# Patient Record
Sex: FEMALE | Race: WHITE | HISPANIC OR LATINO
[De-identification: names, ages, dates, MRNs, and addresses within clinical notes are randomized per-mention and may not be internally consistent; named-entity substitution may affect disease eponyms.]

---

## 2017-02-21 ENCOUNTER — APPOINTMENT (OUTPATIENT)
Dept: INTERNAL MEDICINE | Facility: CLINIC | Age: 57
End: 2017-02-21

## 2017-02-21 ENCOUNTER — OUTPATIENT (OUTPATIENT)
Dept: OUTPATIENT SERVICES | Facility: HOSPITAL | Age: 57
LOS: 1 days | End: 2017-02-21
Payer: SELF-PAY

## 2017-02-21 VITALS
DIASTOLIC BLOOD PRESSURE: 60 MMHG | BODY MASS INDEX: 24.92 KG/M2 | WEIGHT: 132 LBS | SYSTOLIC BLOOD PRESSURE: 102 MMHG | HEIGHT: 61 IN

## 2017-02-21 DIAGNOSIS — R61 GENERALIZED HYPERHIDROSIS: ICD-10-CM

## 2017-02-21 DIAGNOSIS — I10 ESSENTIAL (PRIMARY) HYPERTENSION: ICD-10-CM

## 2017-02-21 DIAGNOSIS — R76.11 NONSPECIFIC REACTION TO TUBERCULIN SKIN TEST WITHOUT ACTIVE TUBERCULOSIS: ICD-10-CM

## 2017-02-21 PROCEDURE — G0463: CPT

## 2017-02-21 PROCEDURE — 90688 IIV4 VACCINE SPLT 0.5 ML IM: CPT

## 2017-02-21 PROCEDURE — G0008: CPT

## 2017-02-24 DIAGNOSIS — R76.12 NONSPECIFIC REACTION TO CELL MEDIATED IMMUNITY MEASUREMENT OF GAMMA INTERFERON ANTIGEN RESPONSE WITHOUT ACTIVE TUBERCULOSIS: ICD-10-CM

## 2017-02-24 DIAGNOSIS — Z23 ENCOUNTER FOR IMMUNIZATION: ICD-10-CM

## 2017-02-24 DIAGNOSIS — R61 GENERALIZED HYPERHIDROSIS: ICD-10-CM

## 2017-02-24 DIAGNOSIS — Z00.00 ENCOUNTER FOR GENERAL ADULT MEDICAL EXAMINATION WITHOUT ABNORMAL FINDINGS: ICD-10-CM

## 2017-02-24 DIAGNOSIS — K21.9 GASTRO-ESOPHAGEAL REFLUX DISEASE WITHOUT ESOPHAGITIS: ICD-10-CM

## 2017-03-28 ENCOUNTER — APPOINTMENT (OUTPATIENT)
Dept: GASTROENTEROLOGY | Facility: HOSPITAL | Age: 57
End: 2017-03-28

## 2017-03-28 ENCOUNTER — OUTPATIENT (OUTPATIENT)
Dept: OUTPATIENT SERVICES | Facility: HOSPITAL | Age: 57
LOS: 1 days | End: 2017-03-28
Payer: SELF-PAY

## 2017-03-28 VITALS
RESPIRATION RATE: 14 BRPM | HEART RATE: 64 BPM | HEIGHT: 61 IN | WEIGHT: 130 LBS | DIASTOLIC BLOOD PRESSURE: 72 MMHG | BODY MASS INDEX: 24.55 KG/M2 | SYSTOLIC BLOOD PRESSURE: 108 MMHG

## 2017-03-28 DIAGNOSIS — K31.9 DISEASE OF STOMACH AND DUODENUM, UNSPECIFIED: ICD-10-CM

## 2017-03-28 DIAGNOSIS — R13.10 DYSPHAGIA, UNSPECIFIED: ICD-10-CM

## 2017-03-28 DIAGNOSIS — Z00.00 ENCOUNTER FOR GENERAL ADULT MEDICAL EXAMINATION WITHOUT ABNORMAL FINDINGS: ICD-10-CM

## 2017-03-28 DIAGNOSIS — K21.9 GASTRO-ESOPHAGEAL REFLUX DISEASE WITHOUT ESOPHAGITIS: ICD-10-CM

## 2017-03-28 DIAGNOSIS — R76.12 NONSPECIFIC REACTION TO CELL MEDIATED IMMUNITY MEASUREMENT OF GAMMA INTERFERON ANTIGEN RESPONSE W/OUT ACTIVE TUBERCULOSIS: ICD-10-CM

## 2017-03-28 DIAGNOSIS — R76.12 NONSPECIFIC REACTION TO CELL MEDIATED IMMUNITY MEASUREMENT OF GAMMA INTERFERON ANTIGEN RESPONSE WITHOUT ACTIVE TUBERCULOSIS: ICD-10-CM

## 2017-03-28 PROCEDURE — G0463: CPT

## 2017-04-04 ENCOUNTER — RESULT REVIEW (OUTPATIENT)
Age: 57
End: 2017-04-04

## 2017-04-05 ENCOUNTER — OUTPATIENT (OUTPATIENT)
Dept: OUTPATIENT SERVICES | Facility: HOSPITAL | Age: 57
LOS: 1 days | End: 2017-04-05
Payer: SELF-PAY

## 2017-04-05 DIAGNOSIS — R13.10 DYSPHAGIA, UNSPECIFIED: ICD-10-CM

## 2017-04-05 PROCEDURE — 88312 SPECIAL STAINS GROUP 1: CPT | Mod: 26

## 2017-04-05 PROCEDURE — 88305 TISSUE EXAM BY PATHOLOGIST: CPT | Mod: 26

## 2017-04-05 PROCEDURE — 43239 EGD BIOPSY SINGLE/MULTIPLE: CPT

## 2017-04-05 PROCEDURE — 88305 TISSUE EXAM BY PATHOLOGIST: CPT

## 2017-04-05 PROCEDURE — 88312 SPECIAL STAINS GROUP 1: CPT

## 2017-04-07 LAB — SURGICAL PATHOLOGY STUDY: SIGNIFICANT CHANGE UP

## 2017-04-25 ENCOUNTER — OTHER (OUTPATIENT)
Age: 57
End: 2017-04-25

## 2017-05-15 ENCOUNTER — LABORATORY RESULT (OUTPATIENT)
Age: 57
End: 2017-05-15

## 2017-05-15 ENCOUNTER — APPOINTMENT (OUTPATIENT)
Dept: INTERNAL MEDICINE | Facility: CLINIC | Age: 57
End: 2017-05-15

## 2017-05-15 ENCOUNTER — OUTPATIENT (OUTPATIENT)
Dept: OUTPATIENT SERVICES | Facility: HOSPITAL | Age: 57
LOS: 1 days | End: 2017-05-15
Payer: SELF-PAY

## 2017-05-15 ENCOUNTER — RESULT CHARGE (OUTPATIENT)
Age: 57
End: 2017-05-15

## 2017-05-15 VITALS
HEART RATE: 62 BPM | BODY MASS INDEX: 25.3 KG/M2 | WEIGHT: 134 LBS | HEIGHT: 61 IN | DIASTOLIC BLOOD PRESSURE: 60 MMHG | SYSTOLIC BLOOD PRESSURE: 90 MMHG

## 2017-05-15 DIAGNOSIS — N93.9 ABNORMAL UTERINE AND VAGINAL BLEEDING, UNSPECIFIED: ICD-10-CM

## 2017-05-15 DIAGNOSIS — I10 ESSENTIAL (PRIMARY) HYPERTENSION: ICD-10-CM

## 2017-05-15 PROCEDURE — 80048 BASIC METABOLIC PNL TOTAL CA: CPT

## 2017-05-15 PROCEDURE — 80061 LIPID PANEL: CPT

## 2017-05-15 PROCEDURE — 84443 ASSAY THYROID STIM HORMONE: CPT

## 2017-05-15 PROCEDURE — 90715 TDAP VACCINE 7 YRS/> IM: CPT

## 2017-05-15 PROCEDURE — G0463: CPT

## 2017-05-15 PROCEDURE — 90471 IMMUNIZATION ADMIN: CPT

## 2017-05-15 PROCEDURE — 85027 COMPLETE CBC AUTOMATED: CPT

## 2017-05-15 PROCEDURE — 83036 HEMOGLOBIN GLYCOSYLATED A1C: CPT

## 2017-05-16 LAB
ANION GAP SERPL CALC-SCNC: 15 MMOL/L — SIGNIFICANT CHANGE UP (ref 5–17)
BUN SERPL-MCNC: 16 MG/DL — SIGNIFICANT CHANGE UP (ref 7–23)
CALCIUM SERPL-MCNC: 9.8 MG/DL — SIGNIFICANT CHANGE UP (ref 8.4–10.5)
CHLORIDE SERPL-SCNC: 102 MMOL/L — SIGNIFICANT CHANGE UP (ref 96–108)
CHOLEST SERPL-MCNC: 203 MG/DL — HIGH (ref 10–199)
CO2 SERPL-SCNC: 25 MMOL/L — SIGNIFICANT CHANGE UP (ref 22–31)
CREAT SERPL-MCNC: 0.87 MG/DL — SIGNIFICANT CHANGE UP (ref 0.5–1.3)
GLUCOSE SERPL-MCNC: 94 MG/DL — SIGNIFICANT CHANGE UP (ref 70–99)
HBA1C BLD-MCNC: 5.6 % — SIGNIFICANT CHANGE UP (ref 4–5.6)
HBA1C MFR BLD HPLC: 5.5
HCT VFR BLD CALC: 45.1 % — HIGH (ref 34.5–45)
HDLC SERPL-MCNC: 57 MG/DL — SIGNIFICANT CHANGE UP (ref 40–125)
HGB BLD-MCNC: 15.1 G/DL — SIGNIFICANT CHANGE UP (ref 11.5–15.5)
LIPID PNL WITH DIRECT LDL SERPL: 131 MG/DL — HIGH
MCHC RBC-ENTMCNC: 31.7 PG — SIGNIFICANT CHANGE UP (ref 27–34)
MCHC RBC-ENTMCNC: 33.5 GM/DL — SIGNIFICANT CHANGE UP (ref 32–36)
MCV RBC AUTO: 94.7 FL — SIGNIFICANT CHANGE UP (ref 80–100)
PLATELET # BLD AUTO: 255 K/UL — SIGNIFICANT CHANGE UP (ref 150–400)
POTASSIUM SERPL-MCNC: 4.3 MMOL/L — SIGNIFICANT CHANGE UP (ref 3.5–5.3)
POTASSIUM SERPL-SCNC: 4.3 MMOL/L — SIGNIFICANT CHANGE UP (ref 3.5–5.3)
RBC # BLD: 4.76 M/UL — SIGNIFICANT CHANGE UP (ref 3.8–5.2)
RBC # FLD: 13.7 % — SIGNIFICANT CHANGE UP (ref 10.3–14.5)
SODIUM SERPL-SCNC: 142 MMOL/L — SIGNIFICANT CHANGE UP (ref 135–145)
TOTAL CHOLESTEROL/HDL RATIO MEASUREMENT: 3.6 RATIO — SIGNIFICANT CHANGE UP (ref 3.3–7.1)
TRIGL SERPL-MCNC: 77 MG/DL — SIGNIFICANT CHANGE UP (ref 10–149)
TSH SERPL-MCNC: 2.24 UIU/ML — SIGNIFICANT CHANGE UP (ref 0.27–4.2)
WBC # BLD: 5.24 K/UL — SIGNIFICANT CHANGE UP (ref 3.8–10.5)
WBC # FLD AUTO: 5.24 K/UL — SIGNIFICANT CHANGE UP (ref 3.8–10.5)

## 2017-05-19 RX ORDER — SUCRALFATE 1 G/1
1 TABLET ORAL
Qty: 31 | Refills: 1 | Status: DISCONTINUED | COMMUNITY
Start: 2017-05-15 | End: 2017-05-19

## 2017-05-22 ENCOUNTER — LABORATORY RESULT (OUTPATIENT)
Age: 57
End: 2017-05-22

## 2017-05-22 ENCOUNTER — RESULT REVIEW (OUTPATIENT)
Age: 57
End: 2017-05-22

## 2017-05-23 ENCOUNTER — APPOINTMENT (OUTPATIENT)
Dept: OBGYN | Facility: CLINIC | Age: 57
End: 2017-05-23

## 2017-05-23 ENCOUNTER — OUTPATIENT (OUTPATIENT)
Dept: OUTPATIENT SERVICES | Facility: HOSPITAL | Age: 57
LOS: 1 days | End: 2017-05-23
Payer: SELF-PAY

## 2017-05-23 ENCOUNTER — RESULT REVIEW (OUTPATIENT)
Age: 57
End: 2017-05-23

## 2017-05-23 VITALS — HEIGHT: 61 IN | BODY MASS INDEX: 25.77 KG/M2 | WEIGHT: 136.5 LBS

## 2017-05-23 DIAGNOSIS — Z01.419 ENCOUNTER FOR GYNECOLOGICAL EXAMINATION (GENERAL) (ROUTINE) W/OUT ABNORMAL FINDINGS: ICD-10-CM

## 2017-05-23 DIAGNOSIS — N76.0 ACUTE VAGINITIS: ICD-10-CM

## 2017-05-23 PROCEDURE — G0463: CPT

## 2017-05-23 PROCEDURE — 87624 HPV HI-RISK TYP POOLED RSLT: CPT

## 2017-05-23 PROCEDURE — 88305 TISSUE EXAM BY PATHOLOGIST: CPT

## 2017-05-23 PROCEDURE — 88305 TISSUE EXAM BY PATHOLOGIST: CPT | Mod: 26

## 2017-05-24 LAB
C TRACH RRNA SPEC QL NAA+PROBE: SIGNIFICANT CHANGE UP
HPV HIGH+LOW RISK DNA PNL CVX: SIGNIFICANT CHANGE UP
N GONORRHOEA RRNA SPEC QL NAA+PROBE: SIGNIFICANT CHANGE UP
SPECIMEN SOURCE: SIGNIFICANT CHANGE UP

## 2017-05-26 LAB — CYTOLOGY SPEC DOC CYTO: SIGNIFICANT CHANGE UP

## 2017-05-30 DIAGNOSIS — N93.9 ABNORMAL UTERINE AND VAGINAL BLEEDING, UNSPECIFIED: ICD-10-CM

## 2017-05-30 DIAGNOSIS — N95.0 POSTMENOPAUSAL BLEEDING: ICD-10-CM

## 2017-05-30 DIAGNOSIS — Z01.419 ENCOUNTER FOR GYNECOLOGICAL EXAMINATION (GENERAL) (ROUTINE) WITHOUT ABNORMAL FINDINGS: ICD-10-CM

## 2017-05-30 DIAGNOSIS — K21.9 GASTRO-ESOPHAGEAL REFLUX DISEASE WITHOUT ESOPHAGITIS: ICD-10-CM

## 2017-06-18 ENCOUNTER — FORM ENCOUNTER (OUTPATIENT)
Age: 57
End: 2017-06-18

## 2017-06-19 ENCOUNTER — MESSAGE (OUTPATIENT)
Age: 57
End: 2017-06-19

## 2017-06-19 ENCOUNTER — OUTPATIENT (OUTPATIENT)
Dept: OUTPATIENT SERVICES | Facility: HOSPITAL | Age: 57
LOS: 1 days | End: 2017-06-19
Payer: SELF-PAY

## 2017-06-19 ENCOUNTER — APPOINTMENT (OUTPATIENT)
Dept: ULTRASOUND IMAGING | Facility: CLINIC | Age: 57
End: 2017-06-19

## 2017-06-19 DIAGNOSIS — N95.0 POSTMENOPAUSAL BLEEDING: ICD-10-CM

## 2017-06-19 DIAGNOSIS — Z01.419 ENCOUNTER FOR GYNECOLOGICAL EXAMINATION (GENERAL) (ROUTINE) WITHOUT ABNORMAL FINDINGS: ICD-10-CM

## 2017-06-19 PROCEDURE — 76856 US EXAM PELVIC COMPLETE: CPT

## 2017-06-19 PROCEDURE — 76830 TRANSVAGINAL US NON-OB: CPT

## 2017-07-09 ENCOUNTER — FORM ENCOUNTER (OUTPATIENT)
Age: 57
End: 2017-07-09

## 2017-07-10 ENCOUNTER — OUTPATIENT (OUTPATIENT)
Dept: OUTPATIENT SERVICES | Facility: HOSPITAL | Age: 57
LOS: 1 days | End: 2017-07-10
Payer: SELF-PAY

## 2017-07-10 ENCOUNTER — APPOINTMENT (OUTPATIENT)
Dept: MRI IMAGING | Facility: CLINIC | Age: 57
End: 2017-07-10

## 2017-07-10 DIAGNOSIS — N95.0 POSTMENOPAUSAL BLEEDING: ICD-10-CM

## 2017-07-10 PROCEDURE — A9585: CPT

## 2017-07-10 PROCEDURE — 72197 MRI PELVIS W/O & W/DYE: CPT

## 2017-07-11 ENCOUNTER — APPOINTMENT (OUTPATIENT)
Dept: GASTROENTEROLOGY | Facility: HOSPITAL | Age: 57
End: 2017-07-11

## 2017-09-11 ENCOUNTER — APPOINTMENT (OUTPATIENT)
Dept: OBGYN | Facility: CLINIC | Age: 57
End: 2017-09-11
Payer: COMMERCIAL

## 2017-09-11 ENCOUNTER — OUTPATIENT (OUTPATIENT)
Dept: OUTPATIENT SERVICES | Facility: HOSPITAL | Age: 57
LOS: 1 days | End: 2017-09-11
Payer: SELF-PAY

## 2017-09-11 VITALS — HEIGHT: 61 IN | BODY MASS INDEX: 27.21 KG/M2 | WEIGHT: 144.13 LBS

## 2017-09-11 DIAGNOSIS — N76.0 ACUTE VAGINITIS: ICD-10-CM

## 2017-09-11 DIAGNOSIS — N95.0 POSTMENOPAUSAL BLEEDING: ICD-10-CM

## 2017-09-11 DIAGNOSIS — N84.0 POLYP OF CORPUS UTERI: ICD-10-CM

## 2017-09-11 PROCEDURE — G0463: CPT

## 2017-09-11 PROCEDURE — 99213 OFFICE O/P EST LOW 20 MIN: CPT | Mod: NC

## 2017-09-19 DIAGNOSIS — N84.0 POLYP OF CORPUS UTERI: ICD-10-CM

## 2017-09-19 DIAGNOSIS — N95.0 POSTMENOPAUSAL BLEEDING: ICD-10-CM

## 2017-11-27 ENCOUNTER — OUTPATIENT (OUTPATIENT)
Dept: OUTPATIENT SERVICES | Facility: HOSPITAL | Age: 57
LOS: 1 days | End: 2017-11-27
Payer: SELF-PAY

## 2017-11-27 VITALS
RESPIRATION RATE: 14 BRPM | TEMPERATURE: 98 F | WEIGHT: 141.1 LBS | DIASTOLIC BLOOD PRESSURE: 78 MMHG | SYSTOLIC BLOOD PRESSURE: 108 MMHG | OXYGEN SATURATION: 97 % | HEART RATE: 53 BPM | HEIGHT: 61 IN

## 2017-11-27 DIAGNOSIS — Z98.890 OTHER SPECIFIED POSTPROCEDURAL STATES: Chronic | ICD-10-CM

## 2017-11-27 DIAGNOSIS — Z01.818 ENCOUNTER FOR OTHER PREPROCEDURAL EXAMINATION: ICD-10-CM

## 2017-11-27 DIAGNOSIS — N95.0 POSTMENOPAUSAL BLEEDING: ICD-10-CM

## 2017-11-27 DIAGNOSIS — N84.0 POLYP OF CORPUS UTERI: ICD-10-CM

## 2017-11-27 LAB
HCT VFR BLD CALC: 40.8 % — SIGNIFICANT CHANGE UP (ref 34.5–45)
HGB BLD-MCNC: 13.5 G/DL — SIGNIFICANT CHANGE UP (ref 11.5–15.5)
MCHC RBC-ENTMCNC: 30.8 PG — SIGNIFICANT CHANGE UP (ref 27–34)
MCHC RBC-ENTMCNC: 33.1 GM/DL — SIGNIFICANT CHANGE UP (ref 32–36)
MCV RBC AUTO: 93.2 FL — SIGNIFICANT CHANGE UP (ref 80–100)
PLATELET # BLD AUTO: 308 K/UL — SIGNIFICANT CHANGE UP (ref 150–400)
RBC # BLD: 4.38 M/UL — SIGNIFICANT CHANGE UP (ref 3.8–5.2)
RBC # FLD: 13.4 % — SIGNIFICANT CHANGE UP (ref 10.3–14.5)
WBC # BLD: 5.65 K/UL — SIGNIFICANT CHANGE UP (ref 3.8–10.5)
WBC # FLD AUTO: 5.65 K/UL — SIGNIFICANT CHANGE UP (ref 3.8–10.5)

## 2017-11-27 PROCEDURE — G0463: CPT

## 2017-11-27 PROCEDURE — 85027 COMPLETE CBC AUTOMATED: CPT

## 2017-11-27 RX ORDER — SODIUM CHLORIDE 9 MG/ML
3 INJECTION INTRAMUSCULAR; INTRAVENOUS; SUBCUTANEOUS EVERY 8 HOURS
Qty: 0 | Refills: 0 | Status: DISCONTINUED | OUTPATIENT
Start: 2017-12-01 | End: 2017-12-16

## 2017-11-27 RX ORDER — ACETAMINOPHEN 500 MG
975 TABLET ORAL ONCE
Qty: 0 | Refills: 0 | Status: COMPLETED | OUTPATIENT
Start: 2017-12-01 | End: 2017-12-01

## 2017-11-27 RX ORDER — LIDOCAINE HCL 20 MG/ML
0.2 VIAL (ML) INJECTION ONCE
Qty: 0 | Refills: 0 | Status: DISCONTINUED | OUTPATIENT
Start: 2017-12-01 | End: 2017-12-16

## 2017-11-27 NOTE — H&P PST ADULT - NSANTHOSAYNRD_GEN_A_CORE
No. SAÚL screening performed.  STOP BANG Legend: 0-2 = LOW Risk; 3-4 = INTERMEDIATE Risk; 5-8 = HIGH Risk

## 2017-11-27 NOTE — H&P PST ADULT - PSH
H/O right inguinal hernia repair    History of bunionectomy of both great toes    Personal history of spine surgery  lumbar with hardware  S/P LASIK surgery  left eye

## 2017-11-27 NOTE — H&P PST ADULT - HISTORY OF PRESENT ILLNESS
58 yo female. . LMP at age 48. h/o + QuantGold with neg CXR- h/o treatment x 8 months when pt was in her 20's, last CXR in 2016- clear per pt, denies SOB, cough, lethargy, flu like symptoms, GERD, hypothyroidism. c/o PMB, pelvic sono revealed endometrial polyp. presents to PST scheduled for D&C. 56 yo female. . LMP at age 48.  c/o PMB, pelvic sono revealed endometrial polyp. presents to PST scheduled for D&C.  h/o flu like symptoms, GERD, hypothyroidism, QuantiFERON-TB test reaction without active TB (pt was treated with INH x 8months in her 20's, -CXR- no change from biapical scarring, otherwise clear,  currently denies SOB, cough, lethargy, s/w anesthesiologist, Dr. Bright, no intervention needed.

## 2017-12-01 ENCOUNTER — RESULT REVIEW (OUTPATIENT)
Age: 57
End: 2017-12-01

## 2017-12-01 ENCOUNTER — APPOINTMENT (OUTPATIENT)
Dept: OBGYN | Facility: CLINIC | Age: 57
End: 2017-12-01

## 2017-12-01 ENCOUNTER — OUTPATIENT (OUTPATIENT)
Dept: OUTPATIENT SERVICES | Facility: HOSPITAL | Age: 57
LOS: 1 days | End: 2017-12-01
Payer: SELF-PAY

## 2017-12-01 ENCOUNTER — TRANSCRIPTION ENCOUNTER (OUTPATIENT)
Age: 57
End: 2017-12-01

## 2017-12-01 VITALS
TEMPERATURE: 98 F | OXYGEN SATURATION: 96 % | DIASTOLIC BLOOD PRESSURE: 68 MMHG | SYSTOLIC BLOOD PRESSURE: 114 MMHG | HEART RATE: 53 BPM | WEIGHT: 141.1 LBS | HEIGHT: 61 IN | RESPIRATION RATE: 14 BRPM

## 2017-12-01 VITALS
OXYGEN SATURATION: 100 % | HEART RATE: 51 BPM | DIASTOLIC BLOOD PRESSURE: 53 MMHG | SYSTOLIC BLOOD PRESSURE: 112 MMHG | RESPIRATION RATE: 14 BRPM

## 2017-12-01 DIAGNOSIS — Z98.890 OTHER SPECIFIED POSTPROCEDURAL STATES: Chronic | ICD-10-CM

## 2017-12-01 DIAGNOSIS — N95.0 POSTMENOPAUSAL BLEEDING: ICD-10-CM

## 2017-12-01 DIAGNOSIS — Z01.818 ENCOUNTER FOR OTHER PREPROCEDURAL EXAMINATION: ICD-10-CM

## 2017-12-01 DIAGNOSIS — N84.0 POLYP OF CORPUS UTERI: ICD-10-CM

## 2017-12-01 PROCEDURE — 58558 HYSTEROSCOPY BIOPSY: CPT

## 2017-12-01 PROCEDURE — 88305 TISSUE EXAM BY PATHOLOGIST: CPT

## 2017-12-01 PROCEDURE — 88305 TISSUE EXAM BY PATHOLOGIST: CPT | Mod: 26

## 2017-12-01 RX ORDER — CELECOXIB 200 MG/1
200 CAPSULE ORAL ONCE
Qty: 0 | Refills: 0 | Status: DISCONTINUED | OUTPATIENT
Start: 2017-12-01 | End: 2017-12-16

## 2017-12-01 RX ORDER — RANITIDINE HYDROCHLORIDE 150 MG/1
1 TABLET, FILM COATED ORAL
Qty: 0 | Refills: 0 | COMMUNITY

## 2017-12-01 RX ORDER — SODIUM CHLORIDE 9 MG/ML
1000 INJECTION, SOLUTION INTRAVENOUS
Qty: 0 | Refills: 0 | Status: DISCONTINUED | OUTPATIENT
Start: 2017-12-01 | End: 2017-12-16

## 2017-12-01 RX ORDER — HYDROMORPHONE HYDROCHLORIDE 2 MG/ML
0.25 INJECTION INTRAMUSCULAR; INTRAVENOUS; SUBCUTANEOUS
Qty: 0 | Refills: 0 | Status: DISCONTINUED | OUTPATIENT
Start: 2017-12-01 | End: 2017-12-01

## 2017-12-01 RX ORDER — ONDANSETRON 8 MG/1
4 TABLET, FILM COATED ORAL ONCE
Qty: 0 | Refills: 0 | Status: DISCONTINUED | OUTPATIENT
Start: 2017-12-01 | End: 2017-12-16

## 2017-12-01 RX ORDER — PANTOPRAZOLE SODIUM 20 MG/1
1 TABLET, DELAYED RELEASE ORAL
Qty: 0 | Refills: 0 | COMMUNITY

## 2017-12-01 RX ORDER — LEVOTHYROXINE SODIUM 125 MCG
1 TABLET ORAL
Qty: 0 | Refills: 0 | COMMUNITY

## 2017-12-01 RX ORDER — OXYCODONE HYDROCHLORIDE 5 MG/1
5 TABLET ORAL ONCE
Qty: 0 | Refills: 0 | Status: DISCONTINUED | OUTPATIENT
Start: 2017-12-01 | End: 2017-12-01

## 2017-12-01 RX ORDER — CELECOXIB 200 MG/1
200 CAPSULE ORAL ONCE
Qty: 0 | Refills: 0 | Status: COMPLETED | OUTPATIENT
Start: 2017-12-01 | End: 2017-12-01

## 2017-12-01 RX ADMIN — CELECOXIB 200 MILLIGRAM(S): 200 CAPSULE ORAL at 11:59

## 2017-12-01 RX ADMIN — Medication 975 MILLIGRAM(S): at 11:59

## 2017-12-01 NOTE — BRIEF OPERATIVE NOTE - PROCEDURE
Hysteroscopy with biopsy or polypectomy  12/01/2017    Active  RSHIBATA  Dilation and curettage  12/01/2017    Active  RSHIBATA <<-----Click on this checkbox to enter Procedure

## 2017-12-01 NOTE — ASU DISCHARGE PLAN (ADULT/PEDIATRIC). - NOTIFY
Persistent Nausea and Vomiting/Unable to Urinate/Bleeding that does not stop/Increased Irritability or Sluggishness/Numbness, color, or temperature change to extremity/Pain not relieved by Medications/Fever greater than 101/GYN Fever>100.4/Numbness, tingling/Inability to Tolerate Liquids or Foods/Excessive Diarrhea/Swelling that continues

## 2017-12-01 NOTE — ASU PATIENT PROFILE, ADULT - PMH
Arthritis    GERD (gastroesophageal reflux disease)    H. pylori infection  treated  History of positive PPD  treated when pt was in her 20's  Hypothyroidism, unspecified type    Migraine

## 2017-12-01 NOTE — BRIEF OPERATIVE NOTE - OPERATION/FINDINGS
Exam under general anesthesia revealed a 5 week sized uterus in the midposition. No adnexal masses palpated. Hysteroscopic examination revealed a small polyp in the right cornua of the fundus. Atropic normal appearing endometrial cavity. Bilateral os visualized, wnl.

## 2017-12-01 NOTE — ASU DISCHARGE PLAN (ADULT/PEDIATRIC). - ITEMS TO FOLLOWUP WITH YOUR PHYSICIAN'S
Follow-up in the office in 2 weeks ([phone]). Call to make/confirm you appointment. Take ibuprofen or acetaminophen  as needed for pain.

## 2017-12-05 LAB — SURGICAL PATHOLOGY STUDY: SIGNIFICANT CHANGE UP

## 2017-12-18 ENCOUNTER — OUTPATIENT (OUTPATIENT)
Dept: OUTPATIENT SERVICES | Facility: HOSPITAL | Age: 57
LOS: 1 days | End: 2017-12-18
Payer: SELF-PAY

## 2017-12-18 ENCOUNTER — APPOINTMENT (OUTPATIENT)
Dept: OBGYN | Facility: CLINIC | Age: 57
End: 2017-12-18
Payer: SELF-PAY

## 2017-12-18 VITALS — SYSTOLIC BLOOD PRESSURE: 120 MMHG | BODY MASS INDEX: 27.4 KG/M2 | DIASTOLIC BLOOD PRESSURE: 70 MMHG | WEIGHT: 145 LBS

## 2017-12-18 DIAGNOSIS — N84.0 POLYP OF CORPUS UTERI: ICD-10-CM

## 2017-12-18 DIAGNOSIS — Z98.890 OTHER SPECIFIED POSTPROCEDURAL STATES: Chronic | ICD-10-CM

## 2017-12-18 DIAGNOSIS — N95.2 POSTMENOPAUSAL ATROPHIC VAGINITIS: ICD-10-CM

## 2017-12-18 DIAGNOSIS — N76.0 ACUTE VAGINITIS: ICD-10-CM

## 2017-12-18 PROCEDURE — 99213 OFFICE O/P EST LOW 20 MIN: CPT | Mod: NC

## 2017-12-18 PROCEDURE — G0463: CPT

## 2017-12-27 DIAGNOSIS — N95.2 POSTMENOPAUSAL ATROPHIC VAGINITIS: ICD-10-CM

## 2018-01-26 ENCOUNTER — APPOINTMENT (OUTPATIENT)
Dept: INTERNAL MEDICINE | Facility: CLINIC | Age: 58
End: 2018-01-26

## 2018-01-26 ENCOUNTER — APPOINTMENT (OUTPATIENT)
Dept: OBGYN | Facility: CLINIC | Age: 58
End: 2018-01-26

## 2018-01-26 ENCOUNTER — OUTPATIENT (OUTPATIENT)
Dept: OUTPATIENT SERVICES | Facility: HOSPITAL | Age: 58
LOS: 1 days | End: 2018-01-26
Payer: SELF-PAY

## 2018-01-26 VITALS
OXYGEN SATURATION: 98 % | SYSTOLIC BLOOD PRESSURE: 118 MMHG | HEART RATE: 60 BPM | HEIGHT: 61 IN | DIASTOLIC BLOOD PRESSURE: 60 MMHG | BODY MASS INDEX: 26.24 KG/M2 | WEIGHT: 139 LBS

## 2018-01-26 DIAGNOSIS — Z98.890 OTHER SPECIFIED POSTPROCEDURAL STATES: Chronic | ICD-10-CM

## 2018-01-26 DIAGNOSIS — R69 ILLNESS, UNSPECIFIED: ICD-10-CM

## 2018-01-26 DIAGNOSIS — I10 ESSENTIAL (PRIMARY) HYPERTENSION: ICD-10-CM

## 2018-01-26 DIAGNOSIS — K29.70 GASTRITIS, UNSPECIFIED, W/OUT BLEEDING: ICD-10-CM

## 2018-01-26 PROCEDURE — G0463: CPT

## 2018-01-29 ENCOUNTER — MED ADMIN CHARGE (OUTPATIENT)
Age: 58
End: 2018-01-29

## 2018-02-08 DIAGNOSIS — K21.9 GASTRO-ESOPHAGEAL REFLUX DISEASE WITHOUT ESOPHAGITIS: ICD-10-CM

## 2018-02-08 DIAGNOSIS — R69 ILLNESS, UNSPECIFIED: ICD-10-CM

## 2018-02-08 DIAGNOSIS — K29.70 GASTRITIS, UNSPECIFIED, WITHOUT BLEEDING: ICD-10-CM

## 2018-04-10 ENCOUNTER — RX RENEWAL (OUTPATIENT)
Age: 58
End: 2018-04-10

## 2018-04-24 ENCOUNTER — APPOINTMENT (OUTPATIENT)
Dept: GASTROENTEROLOGY | Facility: HOSPITAL | Age: 58
End: 2018-04-24

## 2018-04-24 ENCOUNTER — OUTPATIENT (OUTPATIENT)
Dept: OUTPATIENT SERVICES | Facility: HOSPITAL | Age: 58
LOS: 1 days | End: 2018-04-24
Payer: SELF-PAY

## 2018-04-24 VITALS
HEART RATE: 57 BPM | HEIGHT: 61 IN | BODY MASS INDEX: 27.19 KG/M2 | RESPIRATION RATE: 16 BRPM | SYSTOLIC BLOOD PRESSURE: 116 MMHG | DIASTOLIC BLOOD PRESSURE: 68 MMHG | WEIGHT: 144 LBS

## 2018-04-24 DIAGNOSIS — R10.9 UNSPECIFIED ABDOMINAL PAIN: ICD-10-CM

## 2018-04-24 DIAGNOSIS — Z98.890 OTHER SPECIFIED POSTPROCEDURAL STATES: Chronic | ICD-10-CM

## 2018-04-24 DIAGNOSIS — K59.00 CONSTIPATION, UNSPECIFIED: ICD-10-CM

## 2018-04-24 DIAGNOSIS — K21.9 GASTRO-ESOPHAGEAL REFLUX DISEASE WITHOUT ESOPHAGITIS: ICD-10-CM

## 2018-04-24 DIAGNOSIS — K29.70 GASTRITIS, UNSPECIFIED, WITHOUT BLEEDING: ICD-10-CM

## 2018-04-24 DIAGNOSIS — K31.0 ACUTE DILATATION OF STOMACH: ICD-10-CM

## 2018-04-24 LAB
ALBUMIN SERPL ELPH-MCNC: 4.3 G/DL — SIGNIFICANT CHANGE UP (ref 3.3–5)
ALP SERPL-CCNC: 75 U/L — SIGNIFICANT CHANGE UP (ref 40–120)
ALT FLD-CCNC: 18 U/L — SIGNIFICANT CHANGE UP (ref 10–45)
ANION GAP SERPL CALC-SCNC: 13 MMOL/L — SIGNIFICANT CHANGE UP (ref 5–17)
AST SERPL-CCNC: 20 U/L — SIGNIFICANT CHANGE UP (ref 10–40)
BILIRUB SERPL-MCNC: 0.3 MG/DL — SIGNIFICANT CHANGE UP (ref 0.2–1.2)
BUN SERPL-MCNC: 15 MG/DL — SIGNIFICANT CHANGE UP (ref 7–23)
CALCIUM SERPL-MCNC: 10 MG/DL — SIGNIFICANT CHANGE UP (ref 8.4–10.5)
CHLORIDE SERPL-SCNC: 102 MMOL/L — SIGNIFICANT CHANGE UP (ref 96–108)
CO2 SERPL-SCNC: 26 MMOL/L — SIGNIFICANT CHANGE UP (ref 22–31)
CREAT SERPL-MCNC: 0.68 MG/DL — SIGNIFICANT CHANGE UP (ref 0.5–1.3)
GLUCOSE SERPL-MCNC: 91 MG/DL — SIGNIFICANT CHANGE UP (ref 70–99)
POTASSIUM SERPL-MCNC: 4.1 MMOL/L — SIGNIFICANT CHANGE UP (ref 3.5–5.3)
POTASSIUM SERPL-SCNC: 4.1 MMOL/L — SIGNIFICANT CHANGE UP (ref 3.5–5.3)
PROT SERPL-MCNC: 7.8 G/DL — SIGNIFICANT CHANGE UP (ref 6–8.3)
SODIUM SERPL-SCNC: 141 MMOL/L — SIGNIFICANT CHANGE UP (ref 135–145)
T4 FREE+ TSH PNL SERPL: 3.4 UIU/ML — SIGNIFICANT CHANGE UP (ref 0.27–4.2)

## 2018-04-24 PROCEDURE — 85652 RBC SED RATE AUTOMATED: CPT

## 2018-04-24 PROCEDURE — G0463: CPT

## 2018-04-24 PROCEDURE — 80053 COMPREHEN METABOLIC PANEL: CPT

## 2018-04-24 PROCEDURE — 84443 ASSAY THYROID STIM HORMONE: CPT

## 2018-04-24 PROCEDURE — 86364 TISS TRNSGLTMNASE EA IG CLAS: CPT

## 2018-04-24 PROCEDURE — 85027 COMPLETE CBC AUTOMATED: CPT

## 2018-04-25 LAB
ERYTHROCYTE [SEDIMENTATION RATE] IN BLOOD: 16 MM/HR — SIGNIFICANT CHANGE UP (ref 0–20)
HCT VFR BLD CALC: 41.4 % — SIGNIFICANT CHANGE UP (ref 34.5–45)
HGB BLD-MCNC: 14.5 G/DL — SIGNIFICANT CHANGE UP (ref 11.5–15.5)
MCHC RBC-ENTMCNC: 33 PG — SIGNIFICANT CHANGE UP (ref 27–34)
MCHC RBC-ENTMCNC: 35 GM/DL — SIGNIFICANT CHANGE UP (ref 32–36)
MCV RBC AUTO: 94.3 FL — SIGNIFICANT CHANGE UP (ref 80–100)
PLATELET # BLD AUTO: 292 K/UL — SIGNIFICANT CHANGE UP (ref 150–400)
RBC # BLD: 4.39 M/UL — SIGNIFICANT CHANGE UP (ref 3.8–5.2)
RBC # FLD: 13.8 % — SIGNIFICANT CHANGE UP (ref 10.3–14.5)
TTG IGA SER-ACNC: 7.6 UNITS — SIGNIFICANT CHANGE UP
TTG IGA SER-ACNC: NEGATIVE — SIGNIFICANT CHANGE UP
TTG IGG SER IA-ACNC: NEGATIVE — SIGNIFICANT CHANGE UP
TTG IGG SER-ACNC: <5 UNITS — SIGNIFICANT CHANGE UP
WBC # BLD: 7.01 K/UL — SIGNIFICANT CHANGE UP (ref 3.8–10.5)
WBC # FLD AUTO: 7.01 K/UL — SIGNIFICANT CHANGE UP (ref 3.8–10.5)

## 2018-05-09 ENCOUNTER — OUTPATIENT (OUTPATIENT)
Dept: OUTPATIENT SERVICES | Facility: HOSPITAL | Age: 58
LOS: 1 days | End: 2018-05-09
Payer: SELF-PAY

## 2018-05-09 ENCOUNTER — OUTPATIENT (OUTPATIENT)
Dept: OUTPATIENT SERVICES | Facility: HOSPITAL | Age: 58
LOS: 1 days | End: 2018-05-09

## 2018-05-09 ENCOUNTER — APPOINTMENT (OUTPATIENT)
Dept: CT IMAGING | Facility: CLINIC | Age: 58
End: 2018-05-09
Payer: COMMERCIAL

## 2018-05-09 DIAGNOSIS — Z98.890 OTHER SPECIFIED POSTPROCEDURAL STATES: Chronic | ICD-10-CM

## 2018-05-09 DIAGNOSIS — K59.00 CONSTIPATION, UNSPECIFIED: ICD-10-CM

## 2018-05-09 DIAGNOSIS — R10.9 UNSPECIFIED ABDOMINAL PAIN: ICD-10-CM

## 2018-05-09 PROCEDURE — 74177 CT ABD & PELVIS W/CONTRAST: CPT | Mod: 26

## 2018-05-09 PROCEDURE — 74177 CT ABD & PELVIS W/CONTRAST: CPT

## 2018-05-09 PROCEDURE — 87338 HPYLORI STOOL AG IA: CPT

## 2018-05-11 LAB — H PYLORI AG STL QL: NEGATIVE — SIGNIFICANT CHANGE UP

## 2018-05-21 ENCOUNTER — APPOINTMENT (OUTPATIENT)
Dept: INTERNAL MEDICINE | Facility: CLINIC | Age: 58
End: 2018-05-21

## 2018-06-08 ENCOUNTER — RESULT REVIEW (OUTPATIENT)
Age: 58
End: 2018-06-08

## 2018-07-05 ENCOUNTER — RX RENEWAL (OUTPATIENT)
Age: 58
End: 2018-07-05

## 2019-01-02 PROBLEM — R76.11 NONSPECIFIC REACTION TO TUBERCULIN SKIN TEST WITHOUT ACTIVE TUBERCULOSIS: Chronic | Status: ACTIVE | Noted: 2017-11-27

## 2019-01-02 PROBLEM — G43.909 MIGRAINE, UNSPECIFIED, NOT INTRACTABLE, WITHOUT STATUS MIGRAINOSUS: Chronic | Status: ACTIVE | Noted: 2017-11-27

## 2019-01-02 PROBLEM — K21.9 GASTRO-ESOPHAGEAL REFLUX DISEASE WITHOUT ESOPHAGITIS: Chronic | Status: ACTIVE | Noted: 2017-11-27

## 2019-01-02 PROBLEM — A04.8 OTHER SPECIFIED BACTERIAL INTESTINAL INFECTIONS: Chronic | Status: ACTIVE | Noted: 2017-11-27

## 2019-01-09 ENCOUNTER — OUTPATIENT (OUTPATIENT)
Dept: OUTPATIENT SERVICES | Facility: HOSPITAL | Age: 59
LOS: 1 days | End: 2019-01-09
Payer: SELF-PAY

## 2019-01-09 ENCOUNTER — LABORATORY RESULT (OUTPATIENT)
Age: 59
End: 2019-01-09

## 2019-01-09 ENCOUNTER — APPOINTMENT (OUTPATIENT)
Dept: INTERNAL MEDICINE | Facility: CLINIC | Age: 59
End: 2019-01-09

## 2019-01-09 VITALS
DIASTOLIC BLOOD PRESSURE: 80 MMHG | SYSTOLIC BLOOD PRESSURE: 120 MMHG | HEIGHT: 61 IN | BODY MASS INDEX: 26.43 KG/M2 | WEIGHT: 140 LBS

## 2019-01-09 DIAGNOSIS — Z98.890 OTHER SPECIFIED POSTPROCEDURAL STATES: Chronic | ICD-10-CM

## 2019-01-09 DIAGNOSIS — I10 ESSENTIAL (PRIMARY) HYPERTENSION: ICD-10-CM

## 2019-01-09 PROCEDURE — G0463: CPT

## 2019-01-10 LAB
ALBUMIN SERPL ELPH-MCNC: 4.8 G/DL
ALP BLD-CCNC: 84 U/L
ALT SERPL-CCNC: 34 U/L
ANION GAP SERPL CALC-SCNC: 12 MMOL/L
AST SERPL-CCNC: 25 U/L
BASOPHILS # BLD AUTO: 0.06 K/UL
BASOPHILS NFR BLD AUTO: 0.9 %
BILIRUB SERPL-MCNC: 0.5 MG/DL
BUN SERPL-MCNC: 9 MG/DL
CALCIUM SERPL-MCNC: 10 MG/DL
CHLORIDE SERPL-SCNC: 104 MMOL/L
CHOLEST SERPL-MCNC: 193 MG/DL
CHOLEST/HDLC SERPL: 3.7 RATIO
CO2 SERPL-SCNC: 26 MMOL/L
CREAT SERPL-MCNC: 0.72 MG/DL
EOSINOPHIL # BLD AUTO: 0.18 K/UL
EOSINOPHIL NFR BLD AUTO: 2.6 %
GLUCOSE SERPL-MCNC: 80 MG/DL
HBA1C MFR BLD HPLC: 5.6 %
HCT VFR BLD CALC: 44 %
HDLC SERPL-MCNC: 52 MG/DL
HGB BLD-MCNC: 14.9 G/DL
IMM GRANULOCYTES NFR BLD AUTO: 0.3 %
LDLC SERPL CALC-MCNC: 120 MG/DL
LYMPHOCYTES # BLD AUTO: 1.99 K/UL
LYMPHOCYTES NFR BLD AUTO: 28.3 %
MAN DIFF?: NORMAL
MCHC RBC-ENTMCNC: 31.2 PG
MCHC RBC-ENTMCNC: 33.9 GM/DL
MCV RBC AUTO: 92.1 FL
MONOCYTES # BLD AUTO: 0.37 K/UL
MONOCYTES NFR BLD AUTO: 5.3 %
NEUTROPHILS # BLD AUTO: 4.4 K/UL
NEUTROPHILS NFR BLD AUTO: 62.6 %
PLATELET # BLD AUTO: 310 K/UL
POTASSIUM SERPL-SCNC: 4.6 MMOL/L
PROT SERPL-MCNC: 7.9 G/DL
RBC # BLD: 4.78 M/UL
RBC # FLD: 13.7 %
SODIUM SERPL-SCNC: 142 MMOL/L
TRIGL SERPL-MCNC: 103 MG/DL
TSH SERPL-ACNC: 12.04 UIU/ML
WBC # FLD AUTO: 7.02 K/UL

## 2019-01-22 NOTE — REVIEW OF SYSTEMS
[Nasal Discharge] : nasal discharge [Cough] : cough [Fever] : no fever [Chills] : no chills [Fatigue] : no fatigue [Discharge] : no discharge [Vision Problems] : no vision problems [Earache] : no earache [Hearing Loss] : no hearing loss [Sore Throat] : no sore throat [Chest Pain] : no chest pain [Palpitations] : no palpitations [Orthopnea] : no orthopnea [Shortness Of Breath] : no shortness of breath [Wheezing] : no wheezing [Abdominal Pain] : no abdominal pain [Nausea] : no nausea [Constipation] : no constipation [Vomiting] : no vomiting [Dysuria] : no dysuria [Incontinence] : no incontinence [Joint Pain] : no joint pain [Muscle Pain] : no muscle pain [Dizziness] : no dizziness [Fainting] : no fainting [Anxiety] : no anxiety [Depression] : no depression

## 2019-01-22 NOTE — HEALTH RISK ASSESSMENT
[Very Good] : ~his/her~  mood as very good [No falls in past year] : Patient reported no falls in the past year [0] : 2) Feeling down, depressed, or hopeless: Not at all (0) [] : No [[id number]] : 0

## 2019-01-22 NOTE — PLAN
[FreeTextEntry1] : 58F hx hypothyroidism, h pylori s/p treatment, Latent TB s/p treatment presents to clinic for CPE.\par \par #cough- likely post viral bronchitis. Symptoms improving, Will give flonase and tessalon pearle for symptomatic treatment. If symptoms persist may consider short steroid taper to relieve symptoms. Patient will call if other treatments not working. \par \par #hypothyroid- will restart levothyroxine. TSH will be abnormal this visit. Will recheck in 6 weeks to make sure proper dose. \par \par #HCM- pt had negative colonoscopy in 2016. Pap smear negative in 2017. Tdap 2017.  mammogram referral given. Flu shot given. WIll check lipid profile, hb a1c. \par \par case d/w Dr. Kat

## 2019-01-22 NOTE — PHYSICAL EXAM
[No Acute Distress] : no acute distress [Well Developed] : well developed [Normal Sclera/Conjunctiva] : normal sclera/conjunctiva [PERRL] : pupils equal round and reactive to light [EOMI] : extraocular movements intact [Normal Outer Ear/Nose] : the outer ears and nose were normal in appearance [No JVD] : no jugular venous distention [Supple] : supple [No Lymphadenopathy] : no lymphadenopathy [No Respiratory Distress] : no respiratory distress  [Clear to Auscultation] : lungs were clear to auscultation bilaterally [No Accessory Muscle Use] : no accessory muscle use [Normal Rate] : normal rate  [Regular Rhythm] : with a regular rhythm [Normal S1, S2] : normal S1 and S2 [No Murmur] : no murmur heard [Soft] : abdomen soft [Non Tender] : non-tender [No HSM] : no HSM [Normal Bowel Sounds] : normal bowel sounds [Normal Anterior Cervical Nodes] : no anterior cervical lymphadenopathy [No CVA Tenderness] : no CVA  tenderness [No Spinal Tenderness] : no spinal tenderness [No Joint Swelling] : no joint swelling [Grossly Normal Strength/Tone] : grossly normal strength/tone [No Rash] : no rash [Normal Gait] : normal gait [Coordination Grossly Intact] : coordination grossly intact [No Focal Deficits] : no focal deficits [Normal Affect] : the affect was normal [Normal Insight/Judgement] : insight and judgment were intact [de-identified] : oropharynx appear irritated with mucous [de-identified] : mild b/l non pitting edema

## 2019-01-22 NOTE — HISTORY OF PRESENT ILLNESS
[de-identified] : 58F hx hypothyroidism, h pylori s/p treatment, Latent TB s/p treatment presents to clinic for CPE.\par \par #cough- pt states got sick at the end of November. She had cough, congestions, fever/chills and body aches. She took dayquil at the time. Her other symptoms have improved, but she continues to have a productive cough. No associated SOB or chest pain. She states it may have gotten a little better, but continues to bother her. \par \par #hypothyroid- pt states she's been off synthroid for last month because ran out of medication. Denies fatigue, constipation, cold interolance, nail/skin changes. \par \par #HCM- pt had negative colonoscopy in 2016. Pap smear negative in 2017. Tdap 2017.  Due for mammogram.

## 2019-01-23 DIAGNOSIS — E03.9 HYPOTHYROIDISM, UNSPECIFIED: ICD-10-CM

## 2019-05-06 ENCOUNTER — APPOINTMENT (OUTPATIENT)
Dept: INTERNAL MEDICINE | Facility: CLINIC | Age: 59
End: 2019-05-06

## 2019-05-06 ENCOUNTER — OUTPATIENT (OUTPATIENT)
Dept: OUTPATIENT SERVICES | Facility: HOSPITAL | Age: 59
LOS: 1 days | End: 2019-05-06
Payer: SELF-PAY

## 2019-05-06 VITALS
HEART RATE: 75 BPM | DIASTOLIC BLOOD PRESSURE: 80 MMHG | OXYGEN SATURATION: 98 % | BODY MASS INDEX: 27.38 KG/M2 | SYSTOLIC BLOOD PRESSURE: 110 MMHG | HEIGHT: 61 IN | WEIGHT: 145 LBS

## 2019-05-06 DIAGNOSIS — I10 ESSENTIAL (PRIMARY) HYPERTENSION: ICD-10-CM

## 2019-05-06 DIAGNOSIS — Z98.890 OTHER SPECIFIED POSTPROCEDURAL STATES: Chronic | ICD-10-CM

## 2019-05-06 DIAGNOSIS — R21 RASH AND OTHER NONSPECIFIC SKIN ERUPTION: ICD-10-CM

## 2019-05-06 DIAGNOSIS — E03.9 HYPOTHYROIDISM, UNSPECIFIED: ICD-10-CM

## 2019-05-06 PROCEDURE — G0463: CPT

## 2019-05-06 RX ORDER — SUCRALFATE 1 G/1
1 TABLET ORAL
Qty: 1 | Refills: 0 | Status: DISCONTINUED | COMMUNITY
Start: 2017-05-19 | End: 2019-05-06

## 2019-05-06 RX ORDER — PANTOPRAZOLE 40 MG/1
40 TABLET, DELAYED RELEASE ORAL
Qty: 30 | Refills: 3 | Status: DISCONTINUED | COMMUNITY
Start: 2018-04-24 | End: 2019-05-06

## 2019-05-06 RX ORDER — ESTRADIOL 0.1 MG/G
0.1 CREAM VAGINAL
Qty: 1 | Refills: 1 | Status: DISCONTINUED | COMMUNITY
Start: 2017-12-18 | End: 2019-05-06

## 2019-05-06 RX ORDER — HYOSCYAMINE SULFATE 0.12 MG/1
0.12 TABLET, ORALLY DISINTEGRATING ORAL 4 TIMES DAILY
Qty: 360 | Refills: 3 | Status: DISCONTINUED | COMMUNITY
Start: 2018-04-24 | End: 2019-05-06

## 2019-05-06 RX ORDER — POLYETHYLENE GLYCOL 3350 17 G/17G
17 POWDER, FOR SOLUTION ORAL DAILY
Qty: 510 | Refills: 5 | Status: DISCONTINUED | COMMUNITY
Start: 2018-04-24 | End: 2019-05-06

## 2019-05-28 NOTE — PHYSICAL EXAM
[No Acute Distress] : no acute distress [Well Nourished] : well nourished [Well Developed] : well developed [Normal Sclera/Conjunctiva] : normal sclera/conjunctiva [PERRL] : pupils equal round and reactive to light [EOMI] : extraocular movements intact [Normal Outer Ear/Nose] : the outer ears and nose were normal in appearance [No Lymphadenopathy] : no lymphadenopathy [No JVD] : no jugular venous distention [Supple] : supple [Clear to Auscultation] : lungs were clear to auscultation bilaterally [No Respiratory Distress] : no respiratory distress  [No Accessory Muscle Use] : no accessory muscle use [Normal Rate] : normal rate  [Regular Rhythm] : with a regular rhythm [Normal S1, S2] : normal S1 and S2 [No Edema] : there was no peripheral edema [No Abdominal Bruit] : a ~M bruit was not heard ~T in the abdomen [No Carotid Bruits] : no carotid bruits [No Extremity Clubbing/Cyanosis] : no extremity clubbing/cyanosis [Soft] : abdomen soft [Non Tender] : non-tender [No HSM] : no HSM [No Joint Swelling] : no joint swelling [Grossly Normal Strength/Tone] : grossly normal strength/tone [Normal Bowel Sounds] : normal bowel sounds [Normal Gait] : normal gait [Coordination Grossly Intact] : coordination grossly intact [No Focal Deficits] : no focal deficits [de-identified] : +erythematous papular rash on bilateral elbows. +excoriations on bilateral legs

## 2019-05-28 NOTE — ASSESSMENT
[FreeTextEntry1] : 58F PMH hypothyroidism, H Pylori s/p tx, latent TB s/p treatment presents for follow up.\par \par # hypothyroidism - patient hasn't taken synthroid for > 1 month.  recommended to restart synthroid and RTC in 6 weeks to recheck TSH.  Patient requesting TSH to be checked today, explained to patient that will not be useful to check today given hasn't been on synthroid.  No constipation, cold intolerance, major weight gain.  Does have papular rash likely 2/2 hypothyroidism.\par \par # rash - hyperkeratosis / papular lesions likely 2/2 hypothyroidism however given pruritis will treat w/ clobetasol BID x 2 weeks to help w/ symptoms.\par \par RTC in 6 weeks for repeat TSH check and to eval skin symptoms.\par \par d/w Dr. Llanos.

## 2019-05-28 NOTE — HISTORY OF PRESENT ILLNESS
[FreeTextEntry1] : hypothyroidism, rash  [de-identified] : 58F PMH hypothyroidism, latent TB s/p treatment, H Pylori s/p treatment presents for follow up.  Patient requesting thyroid check however has not taken her synthroid for 1 month.  She states that she didn't feel like taking it because she would rather take a "natural supplement."  She has been taking an OTC supplement that she bought online called Actalin with iodine to help her thyroid function instead of synthroid.  She notes for the past year she has had a rash on her right elbow, however for the past month the rash has spread to her whole body.  She has itching and whole body rash, no new soaps/detergents, no new medications.   She took hydrocortisone which helped somewhat.

## 2019-06-10 ENCOUNTER — OUTPATIENT (OUTPATIENT)
Dept: OUTPATIENT SERVICES | Facility: HOSPITAL | Age: 59
LOS: 1 days | End: 2019-06-10
Payer: SELF-PAY

## 2019-06-10 ENCOUNTER — APPOINTMENT (OUTPATIENT)
Dept: INTERNAL MEDICINE | Facility: CLINIC | Age: 59
End: 2019-06-10

## 2019-06-10 ENCOUNTER — LABORATORY RESULT (OUTPATIENT)
Age: 59
End: 2019-06-10

## 2019-06-10 VITALS
HEIGHT: 61 IN | WEIGHT: 144 LBS | SYSTOLIC BLOOD PRESSURE: 102 MMHG | DIASTOLIC BLOOD PRESSURE: 70 MMHG | BODY MASS INDEX: 27.19 KG/M2 | OXYGEN SATURATION: 98 % | HEART RATE: 60 BPM

## 2019-06-10 DIAGNOSIS — Z98.890 OTHER SPECIFIED POSTPROCEDURAL STATES: Chronic | ICD-10-CM

## 2019-06-10 DIAGNOSIS — I10 ESSENTIAL (PRIMARY) HYPERTENSION: ICD-10-CM

## 2019-06-10 PROCEDURE — 36415 COLL VENOUS BLD VENIPUNCTURE: CPT

## 2019-06-10 PROCEDURE — G0463: CPT

## 2019-06-10 PROCEDURE — 80053 COMPREHEN METABOLIC PANEL: CPT

## 2019-06-10 PROCEDURE — 84443 ASSAY THYROID STIM HORMONE: CPT

## 2019-06-10 PROCEDURE — 85027 COMPLETE CBC AUTOMATED: CPT

## 2019-06-10 NOTE — HISTORY OF PRESENT ILLNESS
[de-identified] : 57 yo PMHx of laten TB (treated) and hypothyroidism presented to the clinic after developing non-specific rash over LE . The rash started 1 month ago. It started in left leg and spread to the right. It is intermittent, pruritic, and burning in sensation. It is not associated with warmth, swelling, or fever. She denies any new recent soap, detergent, body wash, clothing, or changes in diet. She has a hypoallergic dog at home. She also travelled to [place] last month. She also reports that her rash improved after taking ampicillin for a dental procedure and use of OTC diaper rash cream. Of note, she takes supplements for her hypothyroidism.  [FreeTextEntry1] : Follow up

## 2019-06-10 NOTE — ASSESSMENT
[FreeTextEntry1] : 57 yo F with latent TB (treated) and hypothyroidism presented with non-specific rash. \par \par #Rash \par -Non specific, most likely fungal rash vs allergic dermatitis \par -Advised to d/c all supplements taken as may be contributory to rash\par -Improved after antifungal cream and ampicillin \par -Less likely cellulitis as pt is afebrile w/o any clinical features. F/w CBC \par -Started on trial clotrimazole cream \par -Derm referral for eval\par -Advised prompt medical evaluation if worsened symptoms, fever, chills, etc.\par \par #Hypothyroidism \par -Continue with levothyroxine\par -F/w with TSH level \par \par D/w Dr. Cuenca

## 2019-06-10 NOTE — PHYSICAL EXAM
[No Acute Distress] : no acute distress [Well Developed] : well developed [Normal Sclera/Conjunctiva] : normal sclera/conjunctiva [PERRL] : pupils equal round and reactive to light [EOMI] : extraocular movements intact [No JVD] : no jugular venous distention [No Lymphadenopathy] : no lymphadenopathy [Supple] : supple [No Respiratory Distress] : no respiratory distress  [Clear to Auscultation] : lungs were clear to auscultation bilaterally [Normal Rate] : normal rate  [No Accessory Muscle Use] : no accessory muscle use [Regular Rhythm] : with a regular rhythm [Normal S1, S2] : normal S1 and S2 [Non Tender] : non-tender [Soft] : abdomen soft [Non-distended] : non-distended [No Masses] : no abdominal mass palpated [de-identified] : Blanchable, flat, errythematous rash over R LE. not well demarcated and non-vesicular. Non-tender to palpation.

## 2019-06-10 NOTE — REVIEW OF SYSTEMS
[Fever] : no fever [Chills] : no chills [Discharge] : no discharge [Chest Pain] : no chest pain [Pain] : no pain [Redness] : no redness [Leg Claudication] : no leg claudication [Palpitations] : no palpitations [Orthopnea] : no orthopnea [Paroysmal Nocturnal Dyspnea] : no paroysmal nocturnal dyspnea [Shortness Of Breath] : no shortness of breath [Wheezing] : no wheezing [Abdominal Pain] : no abdominal pain [Cough] : no cough [Dysuria] : no dysuria [Nausea] : no nausea [Vomiting] : no vomiting [Joint Stiffness] : no joint stiffness [Joint Pain] : no joint pain [Muscle Pain] : no muscle pain

## 2019-06-11 LAB
ALBUMIN SERPL ELPH-MCNC: 4.6 G/DL — SIGNIFICANT CHANGE UP (ref 3.3–5)
ALP SERPL-CCNC: 68 U/L — SIGNIFICANT CHANGE UP (ref 40–120)
ALT FLD-CCNC: 20 U/L — SIGNIFICANT CHANGE UP (ref 10–45)
ANION GAP SERPL CALC-SCNC: 13 MMOL/L — SIGNIFICANT CHANGE UP (ref 5–17)
AST SERPL-CCNC: 20 U/L — SIGNIFICANT CHANGE UP (ref 10–40)
BILIRUB SERPL-MCNC: 0.6 MG/DL — SIGNIFICANT CHANGE UP (ref 0.2–1.2)
BUN SERPL-MCNC: 15 MG/DL — SIGNIFICANT CHANGE UP (ref 7–23)
CALCIUM SERPL-MCNC: 9.4 MG/DL — SIGNIFICANT CHANGE UP (ref 8.4–10.5)
CHLORIDE SERPL-SCNC: 102 MMOL/L — SIGNIFICANT CHANGE UP (ref 96–108)
CO2 SERPL-SCNC: 27 MMOL/L — SIGNIFICANT CHANGE UP (ref 22–31)
CREAT SERPL-MCNC: 0.73 MG/DL — SIGNIFICANT CHANGE UP (ref 0.5–1.3)
GLUCOSE SERPL-MCNC: 84 MG/DL — SIGNIFICANT CHANGE UP (ref 70–99)
HCT VFR BLD CALC: 44.8 % — SIGNIFICANT CHANGE UP (ref 34.5–45)
HGB BLD-MCNC: 14.4 G/DL — SIGNIFICANT CHANGE UP (ref 11.5–15.5)
MCHC RBC-ENTMCNC: 31.2 PG — SIGNIFICANT CHANGE UP (ref 27–34)
MCHC RBC-ENTMCNC: 32.1 GM/DL — SIGNIFICANT CHANGE UP (ref 32–36)
MCV RBC AUTO: 97 FL — SIGNIFICANT CHANGE UP (ref 80–100)
PLATELET # BLD AUTO: 273 K/UL — SIGNIFICANT CHANGE UP (ref 150–400)
POTASSIUM SERPL-MCNC: 4.1 MMOL/L — SIGNIFICANT CHANGE UP (ref 3.5–5.3)
POTASSIUM SERPL-SCNC: 4.1 MMOL/L — SIGNIFICANT CHANGE UP (ref 3.5–5.3)
PROT SERPL-MCNC: 7.5 G/DL — SIGNIFICANT CHANGE UP (ref 6–8.3)
RBC # BLD: 4.62 M/UL — SIGNIFICANT CHANGE UP (ref 3.8–5.2)
RBC # FLD: 13.2 % — SIGNIFICANT CHANGE UP (ref 10.3–14.5)
SODIUM SERPL-SCNC: 142 MMOL/L — SIGNIFICANT CHANGE UP (ref 135–145)
T4 FREE+ TSH PNL SERPL: 1.73 UIU/ML — SIGNIFICANT CHANGE UP (ref 0.27–4.2)
WBC # BLD: 4.95 K/UL — SIGNIFICANT CHANGE UP (ref 3.8–10.5)
WBC # FLD AUTO: 4.95 K/UL — SIGNIFICANT CHANGE UP (ref 3.8–10.5)

## 2019-06-18 DIAGNOSIS — E03.9 HYPOTHYROIDISM, UNSPECIFIED: ICD-10-CM

## 2019-06-18 DIAGNOSIS — R21 RASH AND OTHER NONSPECIFIC SKIN ERUPTION: ICD-10-CM

## 2019-07-09 ENCOUNTER — MESSAGE (OUTPATIENT)
Age: 59
End: 2019-07-09

## 2019-07-20 NOTE — H&P PST ADULT - PMH
Encounter Date: 7/20/2019       History     Chief Complaint   Patient presents with    Headache     Frontal headache that is constant since yesterday after cutting grass. Sensitive to noise. +n/v      CC: Headache    HPI:  This is the evaluation of a 32-year-old male with no known medical problems presenting for evaluation of headache.  Patient was cutting grass yesterday in the heat and began to develop a frontal throbbing headache with associated muscle spasms to his feet, legs, and abdomen.  He also has experienced 4 episodes of nausea and vomiting. No attempted treatment prior to arrival.  He smokes 1 pack cigarettes daily.  No other drug use.  He consumes 2-3 beers daily.    The history is provided by the patient.     Review of patient's allergies indicates:  No Known Allergies  History reviewed. No pertinent past medical history.  History reviewed. No pertinent surgical history.  History reviewed. No pertinent family history.  Social History     Tobacco Use    Smoking status: Current Every Day Smoker     Types: Cigarettes   Substance Use Topics    Alcohol use: Yes    Drug use: Not Currently     Review of Systems   Constitutional: Positive for chills. Negative for fever.   HENT: Negative for congestion, sore throat and voice change.    Respiratory: Negative for cough and shortness of breath.    Cardiovascular: Negative for chest pain.   Gastrointestinal: Positive for abdominal pain, nausea and vomiting. Negative for diarrhea.   Genitourinary: Negative for dysuria.   Musculoskeletal: Positive for myalgias. Negative for back pain.   Skin: Negative for rash.   Neurological: Positive for headaches. Negative for dizziness, speech difficulty, weakness, light-headedness and numbness.   Hematological: Does not bruise/bleed easily.       Physical Exam     Initial Vitals [07/20/19 0320]   BP Pulse Resp Temp SpO2   132/85 96 16 98.2 °F (36.8 °C) 97 %      MAP       --         Physical Exam    Vitals  reviewed.  Constitutional: He appears well-developed and well-nourished. He is not diaphoretic.  Non-toxic appearance. He does not have a sickly appearance. He does not appear ill. No distress.   HENT:   Head: Normocephalic and atraumatic.   Right Ear: External ear normal.   Left Ear: External ear normal.   Nose: Nose normal.   Mouth/Throat: Oropharynx is clear and moist. No oropharyngeal exudate.   Eyes: Conjunctivae and EOM are normal. Pupils are equal, round, and reactive to light. Right eye exhibits no discharge. Left eye exhibits no discharge.   Neck: Normal range of motion.   Cardiovascular: Normal rate, regular rhythm, normal heart sounds and intact distal pulses. Exam reveals no gallop and no friction rub.    No murmur heard.  Pulmonary/Chest: Breath sounds normal. No respiratory distress.   Abdominal: Soft. Bowel sounds are normal. There is no hepatosplenomegaly. There is no tenderness. There is no rigidity, no rebound, no guarding, no CVA tenderness, no tenderness at McBurney's point and negative Victoria's sign.   Musculoskeletal: Normal range of motion.   Neurological: He is alert and oriented to person, place, and time. He has normal strength. No cranial nerve deficit. He displays a negative Romberg sign. GCS eye subscore is 4. GCS verbal subscore is 5. GCS motor subscore is 6.   Skin: Skin is warm, dry and intact. Capillary refill takes less than 2 seconds. No rash noted. No erythema.   Psychiatric: He has a normal mood and affect. Thought content normal.         ED Course   Procedures  Labs Reviewed   CBC W/ AUTO DIFFERENTIAL - Abnormal; Notable for the following components:       Result Value    WBC 12.91 (*)     Gran # (ANC) 10.7 (*)     Gran% 83.0 (*)     Lymph% 10.1 (*)     All other components within normal limits   COMPREHENSIVE METABOLIC PANEL - Abnormal; Notable for the following components:    Sodium 135 (*)     Potassium 5.2 (*)     Chloride 94 (*)     CO2 22 (*)     Glucose 129 (*)     BUN,  Bld 39 (*)     Creatinine 3.8 (*)     Calcium 11.3 (*)     Total Protein 11.1 (*)     Alkaline Phosphatase 144 (*)     Anion Gap 19 (*)     eGFR if  23 (*)     eGFR if non  19 (*)     All other components within normal limits   CK - Abnormal; Notable for the following components:     (*)     All other components within normal limits   URINALYSIS, REFLEX TO URINE CULTURE - Abnormal; Notable for the following components:    Appearance, UA Cloudy (*)     Protein, UA 1+ (*)     Occult Blood UA 2+ (*)     Leukocytes, UA Trace (*)     All other components within normal limits    Narrative:     Preferred Collection Type->Urine, Clean Catch   CHLORIDE, URINE, RANDOM - Abnormal; Notable for the following components:    Chloride, Rand Ur <20 (*)     All other components within normal limits    Narrative:     Preferred Collection Type->Urine, Clean Catch   MAGNESIUM - Abnormal; Notable for the following components:    Magnesium 2.7 (*)     All other components within normal limits   URINALYSIS MICROSCOPIC - Abnormal; Notable for the following components:    RBC, UA 12 (*)     WBC, UA 35 (*)     Hyaline Casts, UA 40 (*)     Granular Casts, UA 3 (*)     All other components within normal limits    Narrative:     Preferred Collection Type->Urine, Clean Catch   CULTURE, URINE   C. TRACHOMATIS/N. GONORRHOEAE BY AMP DNA   CREATININE, URINE, RANDOM    Narrative:     Preferred Collection Type->Urine, Clean Catch   SODIUM, URINE, RANDOM    Narrative:     Preferred Collection Type->Urine, Clean Catch   POTASSIUM, URINE, RANDOM     EKG Readings: (Independently Interpreted)   Initial Reading: No STEMI. Rhythm: Normal Sinus Rhythm. Heart Rate: 67. Ectopy: No Ectopy. Conduction: Normal.       Imaging Results    None          Medical Decision Making:   ED Management:  This is the evaluation of a 32-year-old male with no known medical problems presenting to the emergency department with headache, muscle  cramping, nausea, vomiting that occurred while working outside cutting the grass.  Workup remarkable for dehydration with acute kidney injury. Creatinine is elevated at 3.8.  GFR 23.  .  He is urinating without difficulty.  Microscopic UA with 35 white blood cells, occasional bacteria.  He is asymptomatic.  Will defer treatment at this time pending cultures. Patient denies any flank pain. Doubt stone.  No CVA tenderness to suggest pyelonephritis.  Patient was aggressively rehydrated with IV fluids and given morphine and Zofran with improvement in symptoms.  Upon reassessment, he is pain-free.  He no longer has headache or muscle cramping.  He is tolerating oral intake.  Will admit patient for IV fluid resuscitation and repeat blood work. Hospital Medicine was paged. Call returned and the case was discussed.  Dr. Reilly will accept the admission to the Hospital Service.  Admit orders will be completed. The diagnosis, treatment and plan for admission were discussed with the patient and understanding was verbalized. All questions or concerns have been addressed.     This case was discussed with and the patient has been examined by Dr. Ureña who is in agreement with my assessment and plan.                         Clinical Impression:       ICD-10-CM ICD-9-CM   1. DANILO (acute kidney injury) N17.9 584.9   2. Dehydration E86.0 276.51   3. Hyperkalemia E87.5 276.7   4. Acute cystitis with hematuria N30.01 595.0         Disposition:   Disposition: Admitted  Condition: Stable                        Rozina Parker NP  07/20/19 0711     Arthritis    GERD (gastroesophageal reflux disease)    H. pylori infection  treated  History of positive PPD  treated when pt was in her 20's  Hypothyroidism, unspecified type    Migraine

## 2019-07-22 ENCOUNTER — MESSAGE (OUTPATIENT)
Age: 59
End: 2019-07-22

## 2019-11-14 ENCOUNTER — APPOINTMENT (OUTPATIENT)
Dept: DERMATOLOGY | Facility: HOSPITAL | Age: 59
End: 2019-11-14

## 2019-11-25 ENCOUNTER — MEDICATION RENEWAL (OUTPATIENT)
Age: 59
End: 2019-11-25

## 2020-01-06 ENCOUNTER — APPOINTMENT (OUTPATIENT)
Dept: INTERNAL MEDICINE | Facility: CLINIC | Age: 60
End: 2020-01-06

## 2020-01-27 ENCOUNTER — APPOINTMENT (OUTPATIENT)
Dept: INTERNAL MEDICINE | Facility: CLINIC | Age: 60
End: 2020-01-27

## 2020-01-27 ENCOUNTER — OUTPATIENT (OUTPATIENT)
Dept: OUTPATIENT SERVICES | Facility: HOSPITAL | Age: 60
LOS: 1 days | End: 2020-01-27
Payer: MEDICAID

## 2020-01-27 ENCOUNTER — NON-APPOINTMENT (OUTPATIENT)
Age: 60
End: 2020-01-27

## 2020-01-27 VITALS
HEIGHT: 61 IN | SYSTOLIC BLOOD PRESSURE: 102 MMHG | BODY MASS INDEX: 27 KG/M2 | DIASTOLIC BLOOD PRESSURE: 78 MMHG | WEIGHT: 143 LBS

## 2020-01-27 DIAGNOSIS — I10 ESSENTIAL (PRIMARY) HYPERTENSION: ICD-10-CM

## 2020-01-27 DIAGNOSIS — M79.10 MYALGIA, UNSPECIFIED SITE: ICD-10-CM

## 2020-01-27 DIAGNOSIS — Z98.890 OTHER SPECIFIED POSTPROCEDURAL STATES: Chronic | ICD-10-CM

## 2020-01-27 DIAGNOSIS — R42 DIZZINESS AND GIDDINESS: ICD-10-CM

## 2020-01-27 PROCEDURE — G0463: CPT | Mod: 25

## 2020-01-27 PROCEDURE — 90688 IIV4 VACCINE SPLT 0.5 ML IM: CPT

## 2020-01-27 PROCEDURE — 93005 ELECTROCARDIOGRAM TRACING: CPT

## 2020-01-27 PROCEDURE — G0008: CPT

## 2020-01-27 RX ORDER — CLOBETASOL PROPIONATE 0.5 MG/G
0.05 OINTMENT TOPICAL
Qty: 1 | Refills: 0 | Status: DISCONTINUED | COMMUNITY
Start: 2019-05-06 | End: 2020-01-27

## 2020-01-27 RX ORDER — RANITIDINE 150 MG/1
150 TABLET ORAL
Qty: 30 | Refills: 2 | Status: DISCONTINUED | COMMUNITY
Start: 2017-01-30 | End: 2020-01-27

## 2020-01-27 RX ORDER — CLOTRIMAZOLE 10 MG/G
1 CREAM TOPICAL 3 TIMES DAILY
Qty: 1 | Refills: 0 | Status: DISCONTINUED | COMMUNITY
Start: 2019-06-10 | End: 2020-01-27

## 2020-01-27 NOTE — PHYSICAL EXAM
[No Acute Distress] : no acute distress [No Rash] : no rash [Normal] : normal gait, coordination grossly intact, no focal deficits and deep tendon reflexes were 2+ and symmetric [Alert and Oriented x3] : oriented to person, place, and time [de-identified] : Heart rate on the slower side.  [de-identified] : Mild paraspinal muscle tenderness [de-identified] : BUE/BLE full ROM and 4+ -5/5 strength, able to raise on toes in standing [de-identified] : No significant rashes observed on back or BLE.  [de-identified] : Speech soft and mildly slowed, but no thought latency/block, and goal-oriented conversations

## 2020-01-27 NOTE — HISTORY OF PRESENT ILLNESS
[FreeTextEntry8] : 58 yo F with latent TB (treated) and hypothyroidism presented with generalized fatigue, muscle pain and weakness x months. \par \par Patient reported (+) constipation, dry hair, dry eyes, low energy, lightheadedness, cold intolerance, increased muscle weakness and pain, difficulty raising right arm and imbalance with RLE.  Burning with LLE, and ascending pruritic rash in the past months. No acute onset, injuries, recent illnesses, travels, diet change, detergent/shampoo or medication changes. \par \par CP x 2 incidences a few weeks ago, while at rest/standing, no heavy exertions. Described as substernal sharp pain/pressure, non-radiating, and no particular patterns. Also had incidences of palpitations/heart racing while waking up, and patient was concerned about her thyroid level.

## 2020-01-27 NOTE — ASSESSMENT
[FreeTextEntry1] : 58 yo F with latent TB (treated) and hypothyroidism presented with generalized fatigue, muscle pain and weakness x months. \par \par \par #Hypothyroidism\par - Symptoms may related to hypothyroidism.\par - Will check TSH with T4 reflex and adjust Synthroid accordingly; Patient would like to have referral for endocrinology if the TSH level is not well controlled. \par - PHQ-9 score 17, mostly scoring high on energy/appetite/sleep, which may be related to hypothyroidism, no alarming flags for suicidality or low-esteem.\par \par #Myalgia/myopathy\par - Will check serum CK. Neurologically non-focal, and strength grossly intact despite subjective weakness. \par \par #Chest pain\par - May related to hypothyroidism. Symptoms less likely angina.\par - EKG wnl x bradycardia at 54. \par - Referral for excersive stress test\par \par #Rash/dry eyes\par - Subjective rash, no objective findings\par - May relate to dry skin, also dry eyes\par - Advised to apply Aquaphor or other moisturizing creams consistently over the body before bedtime\par - Will check KIM, anti-lo, anti-la given dry eyes/skin and hypothyroidism\par - C/w OTC hydrocortisone prn for itchiness, but advised to not use for long term\par \par #HCM\par - Flu vaccine today\par - Pap smear negative 2017\par - Colon screening 2016 with (+) inflammation, was advised to return in 5 years.\par - Other vaccines UTD\par \par Case d/w Dr. Hameed

## 2020-01-27 NOTE — REVIEW OF SYSTEMS
[Fatigue] : fatigue [Dryness] : dryness  [Vision Problems] : vision problems [Chest Pain] : chest pain [Palpitations] : palpitations [Constipation] : constipation [Joint Pain] : joint pain [Muscle Weakness] : muscle weakness [Muscle Pain] : muscle pain [Back Pain] : back pain [Itching] : Itching [Hair Changes] : hair changes [Skin Rash] : skin rash [Negative] : Genitourinary [Fever] : no fever [Chills] : no chills [Night Sweats] : no night sweats [Recent Change In Weight] : ~T no recent weight change [Discharge] : no discharge [Redness] : no redness [Lower Ext Edema] : no lower extremity edema [Abdominal Pain] : no abdominal pain [Nausea] : no nausea [Diarrhea] : diarrhea [Vomiting] : no vomiting [Melena] : no melena [Joint Stiffness] : no joint stiffness [Joint Swelling] : no joint swelling

## 2020-01-28 DIAGNOSIS — H04.123 DRY EYE SYNDROME OF BILATERAL LACRIMAL GLANDS: ICD-10-CM

## 2020-01-28 DIAGNOSIS — M79.10 MYALGIA, UNSPECIFIED SITE: ICD-10-CM

## 2020-01-28 DIAGNOSIS — R07.89 OTHER CHEST PAIN: ICD-10-CM

## 2020-01-28 DIAGNOSIS — E03.9 HYPOTHYROIDISM, UNSPECIFIED: ICD-10-CM

## 2020-01-28 DIAGNOSIS — R21 RASH AND OTHER NONSPECIFIC SKIN ERUPTION: ICD-10-CM

## 2020-01-28 DIAGNOSIS — Z23 ENCOUNTER FOR IMMUNIZATION: ICD-10-CM

## 2020-01-28 LAB
CK SERPL-CCNC: 93 U/L
ENA SS-A AB SER IA-ACNC: <0.2 AL
ENA SS-B AB SER IA-ACNC: <0.2 AL
TSH SERPL-ACNC: 1.91 UIU/ML

## 2020-01-30 PROBLEM — R42 DIZZINESS: Status: ACTIVE | Noted: 2020-01-30

## 2020-03-02 LAB
ANA PAT FLD IF-IMP: ABNORMAL
ANA PATTERN: ABNORMAL
ANA SER IF-ACNC: ABNORMAL
ANA TITER: ABNORMAL

## 2020-06-05 ENCOUNTER — RX RENEWAL (OUTPATIENT)
Age: 60
End: 2020-06-05

## 2020-11-16 ENCOUNTER — RESULT REVIEW (OUTPATIENT)
Age: 60
End: 2020-11-16

## 2020-12-15 PROBLEM — Z01.419 ENCOUNTER FOR CERVICAL PAP SMEAR WITH PELVIC EXAM: Status: RESOLVED | Noted: 2017-05-23 | Resolved: 2020-12-15

## 2021-04-29 ENCOUNTER — APPOINTMENT (OUTPATIENT)
Dept: HEPATOLOGY | Facility: CLINIC | Age: 61
End: 2021-04-29

## 2021-08-16 ENCOUNTER — APPOINTMENT (OUTPATIENT)
Dept: INTERNAL MEDICINE | Facility: CLINIC | Age: 61
End: 2021-08-16

## 2021-09-28 ENCOUNTER — NON-APPOINTMENT (OUTPATIENT)
Age: 61
End: 2021-09-28

## 2021-09-28 ENCOUNTER — APPOINTMENT (OUTPATIENT)
Dept: INTERNAL MEDICINE | Facility: CLINIC | Age: 61
End: 2021-09-28
Payer: MEDICAID

## 2021-09-28 VITALS
OXYGEN SATURATION: 97 % | HEIGHT: 61 IN | DIASTOLIC BLOOD PRESSURE: 61 MMHG | HEART RATE: 59 BPM | BODY MASS INDEX: 27.56 KG/M2 | SYSTOLIC BLOOD PRESSURE: 106 MMHG | WEIGHT: 146 LBS | TEMPERATURE: 98.3 F

## 2021-09-28 DIAGNOSIS — I78.1 NEVUS, NON-NEOPLASTIC: ICD-10-CM

## 2021-09-28 PROCEDURE — 99386 PREV VISIT NEW AGE 40-64: CPT | Mod: 25

## 2021-09-28 PROCEDURE — 93000 ELECTROCARDIOGRAM COMPLETE: CPT

## 2021-09-28 NOTE — HISTORY OF PRESENT ILLNESS
[FreeTextEntry1] : cpx [de-identified] : cpx\par seeing derm ([facility]--[facility]) for skin issues--on betamethasone and clinda topical\par on thyroid replacement with 30mg armour thryoid\par brings labs from July showing 4.58 tsh, edel 1:80 \par covid vaccinated\par hx treated latent tb 40 yrs ago\par has episodic, non-exertional atypical chest pain\par vague historian

## 2021-09-28 NOTE — HEALTH RISK ASSESSMENT
[Patient reported mammogram was normal] : Patient reported mammogram was normal [Patient reported colonoscopy was normal] : Patient reported colonoscopy was normal [With Family] : lives with family [Fully functional (bathing, dressing, toileting, transferring, walking, feeding)] : Fully functional (bathing, dressing, toileting, transferring, walking, feeding) [Fully functional (using the telephone, shopping, preparing meals, housekeeping, doing laundry, using] : Fully functional and needs no help or supervision to perform IADLs (using the telephone, shopping, preparing meals, housekeeping, doing laundry, using transportation, managing medications and managing finances) [MammogramDate] : 1/20 [ColonoscopyDate] : 1/20

## 2021-09-29 LAB
APPEARANCE: CLEAR
BACTERIA: NEGATIVE
BILIRUBIN URINE: NEGATIVE
BLOOD URINE: NEGATIVE
CHOLEST SERPL-MCNC: 193 MG/DL
CK SERPL-CCNC: 104 U/L
COLOR: NORMAL
CRP SERPL-MCNC: <3 MG/L
ERYTHROCYTE [SEDIMENTATION RATE] IN BLOOD BY WESTERGREN METHOD: 30 MM/HR
GLUCOSE QUALITATIVE U: NEGATIVE
HDLC SERPL-MCNC: 47 MG/DL
HYALINE CASTS: 2 /LPF
KETONES URINE: NEGATIVE
LDLC SERPL CALC-MCNC: 114 MG/DL
LEUKOCYTE ESTERASE URINE: NEGATIVE
MICROSCOPIC-UA: NORMAL
NITRITE URINE: NEGATIVE
NONHDLC SERPL-MCNC: 146 MG/DL
PH URINE: 6.5
PROTEIN URINE: NEGATIVE
RED BLOOD CELLS URINE: 3 /HPF
RHEUMATOID FACT SER QL: <10 IU/ML
SPECIFIC GRAVITY URINE: 1.02
SQUAMOUS EPITHELIAL CELLS: 2 /HPF
T4 FREE SERPL-MCNC: 1.2 NG/DL
THYROGLOB AB SERPL-ACNC: <20 IU/ML
THYROPEROXIDASE AB SERPL IA-ACNC: <10 IU/ML
TRIGL SERPL-MCNC: 159 MG/DL
TSH SERPL-ACNC: 1.84 UIU/ML
UROBILINOGEN URINE: NORMAL
WHITE BLOOD CELLS URINE: 1 /HPF

## 2021-09-30 LAB
CCP AB SER IA-ACNC: <8 UNITS
DSDNA AB SER-ACNC: <12 IU/ML
ENA SCL70 IGG SER IA-ACNC: <0.2 AL
ENA SS-A AB SER IA-ACNC: <0.2 AL
ENA SS-B AB SER IA-ACNC: <0.2 AL
RF+CCP IGG SER-IMP: NEGATIVE

## 2021-10-02 LAB
ANA PAT FLD IF-IMP: ABNORMAL
ANA SER IF-ACNC: ABNORMAL

## 2021-10-12 ENCOUNTER — APPOINTMENT (OUTPATIENT)
Dept: CARDIOLOGY | Facility: CLINIC | Age: 61
End: 2021-10-12
Payer: MEDICAID

## 2021-10-12 ENCOUNTER — NON-APPOINTMENT (OUTPATIENT)
Age: 61
End: 2021-10-12

## 2021-10-12 VITALS
WEIGHT: 146 LBS | BODY MASS INDEX: 27.56 KG/M2 | OXYGEN SATURATION: 96 % | SYSTOLIC BLOOD PRESSURE: 113 MMHG | HEART RATE: 69 BPM | HEIGHT: 61 IN | DIASTOLIC BLOOD PRESSURE: 69 MMHG

## 2021-10-12 DIAGNOSIS — R94.31 ABNORMAL ELECTROCARDIOGRAM [ECG] [EKG]: ICD-10-CM

## 2021-10-12 DIAGNOSIS — R60.0 LOCALIZED EDEMA: ICD-10-CM

## 2021-10-12 DIAGNOSIS — R07.89 OTHER CHEST PAIN: ICD-10-CM

## 2021-10-12 PROCEDURE — 93000 ELECTROCARDIOGRAM COMPLETE: CPT

## 2021-10-12 PROCEDURE — 99204 OFFICE O/P NEW MOD 45 MIN: CPT

## 2021-10-12 NOTE — PHYSICAL EXAM
[Soft] : abdomen soft [Non Tender] : non-tender [Edema ___] : edema [unfilled] [Normal] : alert and oriented, normal memory

## 2021-10-12 NOTE — DISCUSSION/SUMMARY
[FreeTextEntry1] : atypical chest symptoms\par abnl ECG - no recent echocardiogram\par \par -check TTE to eval for structural heart disease, possible vaccine-related heart involvement (low suspicion)\par -check treadmill stress test to evaluate for coronary ischemia. there is a low pretest probability of CAD. pt is interested in resuming exercise if stress test is without ischemia

## 2021-10-12 NOTE — HISTORY OF PRESENT ILLNESS
[FreeTextEntry1] : 60F hx of hypothyroidism, LTBI s/p tx, \par \par she has a hx of atypical chest pain, at times sharp, pinching, a/w SOB. usually better with massage\par  \par vaccinated as of 9/2021\par \par +LE edema, chronic\par no orthopnea, PND\par +palpitations, not a/w chest pain\par \par No longer regularly exercising due to concern for vaccine-related heart disease\par previously went on exercise bike\par  \par \par 9/28/2021: \par Chol 193//HDL 47/ \par \par Fam hx:\par no premature CAD or SCD\par \par Pregnancy hx:\par four\par first - miscarriage\par second and third - no premature birth, PEC, or GDM\par fourth - miscarriage\par \par

## 2021-10-12 NOTE — REVIEW OF SYSTEMS
[Chest Discomfort] : chest discomfort [Lower Ext Edema] : lower extremity edema [Palpitations] : palpitations [Joint Pain] : joint pain [Rash] : rash [Negative] : Heme/Lymph [SOB] : no shortness of breath [Dyspnea on exertion] : not dyspnea during exertion [Leg Claudication] : no intermittent leg claudication [Orthopnea] : no orthopnea [PND] : no PND [Syncope] : no syncope

## 2021-10-25 ENCOUNTER — APPOINTMENT (OUTPATIENT)
Dept: CARDIOLOGY | Facility: CLINIC | Age: 61
End: 2021-10-25

## 2021-11-15 ENCOUNTER — APPOINTMENT (OUTPATIENT)
Dept: CARDIOLOGY | Facility: CLINIC | Age: 61
End: 2021-11-15
Payer: MEDICAID

## 2021-11-15 PROCEDURE — 93015 CV STRESS TEST SUPVJ I&R: CPT

## 2021-11-15 PROCEDURE — 93306 TTE W/DOPPLER COMPLETE: CPT

## 2021-11-15 PROCEDURE — G0008: CPT

## 2021-11-15 PROCEDURE — 90686 IIV4 VACC NO PRSV 0.5 ML IM: CPT

## 2022-02-01 ENCOUNTER — APPOINTMENT (OUTPATIENT)
Dept: ENDOCRINOLOGY | Facility: CLINIC | Age: 62
End: 2022-02-01

## 2022-02-18 ENCOUNTER — APPOINTMENT (OUTPATIENT)
Dept: ENDOCRINOLOGY | Facility: CLINIC | Age: 62
End: 2022-02-18
Payer: MEDICAID

## 2022-02-18 VITALS
HEART RATE: 68 BPM | SYSTOLIC BLOOD PRESSURE: 123 MMHG | HEIGHT: 61 IN | DIASTOLIC BLOOD PRESSURE: 68 MMHG | OXYGEN SATURATION: 98 % | RESPIRATION RATE: 17 BRPM | WEIGHT: 144 LBS | BODY MASS INDEX: 27.19 KG/M2 | TEMPERATURE: 98 F

## 2022-02-18 DIAGNOSIS — Z13.820 ENCOUNTER FOR SCREENING FOR OSTEOPOROSIS: ICD-10-CM

## 2022-02-18 PROCEDURE — 76536 US EXAM OF HEAD AND NECK: CPT

## 2022-02-18 PROCEDURE — 99204 OFFICE O/P NEW MOD 45 MIN: CPT | Mod: 25

## 2022-02-18 RX ORDER — LEVOTHYROXINE SODIUM 0.05 MG/1
50 TABLET ORAL
Qty: 90 | Refills: 0 | Status: DISCONTINUED | COMMUNITY
Start: 2017-12-18 | End: 2022-02-18

## 2022-02-18 RX ORDER — HYDROCORTISONE 10 MG/G
1 CREAM TOPICAL
Qty: 1 | Refills: 0 | Status: DISCONTINUED | COMMUNITY
Start: 2020-01-27 | End: 2022-02-18

## 2022-02-18 RX ORDER — GLYCERIN/PROPYLENE GLYCOL 0.6 %-0.6%
0.6-0.6 DROPPERETTE, SINGLE-USE DROP DISPENSER OPHTHALMIC (EYE)
Qty: 1 | Refills: 0 | Status: DISCONTINUED | COMMUNITY
Start: 2020-01-27 | End: 2022-02-18

## 2022-02-18 NOTE — HISTORY OF PRESENT ILLNESS
[FreeTextEntry1] : 61 year female  referred for hypothyroidism management. \par Ms. MOHAMUD  was diagnosed with hypothyroidism about 20 years ago. On L-thyroxine for some time, later switched to Synthroid, however never felt well on it. She was switched to [place] Thyroid with a significant improvement, and is currently taking 30 mg qd.\par She noticed some difficulties swallowing dry food, not enough saliva production, and is concerned that it might be related to her thyroid. She saw a GI in the past with a normal work up. She was recently diagnosed with a lupus.\par Last TSH- 1.8\par Denies family history of thyroid cancer or history of radiation exposure to head and neck area in a childhood.\par Her daughter is also treated for hypothyroidism. \par Thyroid US (in-office, 2/18/22)- small, heterogenous thyroid. No nodules. left lateral, level III 0.9x0.5x0.7 cm LN, oval in shape, no hilum with thickened walls and possible calcifications

## 2022-02-18 NOTE — IMPRESSION
[FreeTextEntry1] : Small, very heterogeneous hypervascular thyroid without definitive nodules.\par Left lateral neck ovoid 0.9x0.5x0.7 cm lymph node,  fatty hilum is absent, with thickened walls and questionable calcification. [FreeTextEntry2] : CT neck and consider FNA after

## 2022-02-18 NOTE — HISTORY OF PRESENT ILLNESS
[FreeTextEntry1] : 61 year female  referred for hypothyroidism management. \par Ms. MOHAMUD  was diagnosed with hypothyroidism about 20 years ago. On L-thyroxine for some time, later switched to Synthroid, however never felt well on it. She was switched to [place] Thyroid with a significant improvement, and is currently taking 30 mg qd.\par She noticed some difficulties swallowing dry food, not enough saliva production, and is concerned that it might be related to her thyroid. She saw a GI in the past with a normal work up. She was recently diagnosed with a lupus.\par Last TSH- 1.8\par Denies family history of thyroid cancer or history of radiation exposure to head and neck area in a childhood.\par Her daughter is also treated for hypothyroidism. \par Thyroid US (in-office, 2/18/22)- small, heterogenous thyroid. No nodules. left lateral, level III 0.9x0.5x0.7 cm LN, oval in shape, no hilum with thickened walls and possible calcifications

## 2022-02-18 NOTE — ASSESSMENT
[FreeTextEntry1] : 1. Hypothyroidism\par - repeat thyroid panel, antibodies\par - continue [place] thyroid 30 mg qd, pending results\par - we discussed R+B of the T3/T4 combination\par - screening DXA 3 sites\par - calcium 500 mg bid, add extra OTC vitamin D3 2,000 IU/day\par - continue weight-bearing exercises; advised avoiding high risk sports\par \par 2. Suspicious left neck JANELLE\par - CT neck and will likely refer for FNAB\par \par RTC post imaging\par

## 2022-02-18 NOTE — ASSESSMENT
[FreeTextEntry1] : 1. Hypothyroidism\par - repeat thyroid panel, antibodies\par - continue [place] thyroid 30 mg qd, pending results\par - we discussed R+B of the T3/T4 combination\par - screening DXA 3 sites\par - calcium 500 mg bid, add extra OTC vitamin D3 2,000 IU/day\par - continue weight-bearing exercises; advised avoiding high risk sports\par \par 2. Suspicious left neck JANELLE\par - CT neck and will likely refer for FNAB\par \par RTC post imaging\par

## 2022-02-18 NOTE — PROCEDURE
[None] : None [Other: ___] : [unfilled] [] : a moderately vascular parenchyma [There are no distinct nodules visualized.] : There are no distinct nodules visualized. [In level ___] : abnormal lymph nodes are seen in level [unfilled] [Short/long axis ratio, >0.5] : with short/long axis ratio, >0.5 [Hypoecheoic] : The lymph node is hypoecheoic [Absence of hilar line] : absence of hilar line [Amorphous calcifications] : amorphous calcifications [Regular] : regular [Distinct] : distinct [No vascularity] : no vascularity [FreeTextEntry1] : 3.4x1.1x1.0 [FreeTextEntry5] : 2.7x0.7x1.1 [FreeTextEntry2] : 0.2

## 2022-02-22 LAB
T3 SERPL-MCNC: 132 NG/DL
T4 FREE SERPL-MCNC: 1 NG/DL
THYROGLOB AB SERPL-ACNC: <20 IU/ML
THYROPEROXIDASE AB SERPL IA-ACNC: <10 IU/ML
TSH SERPL-ACNC: 2.84 UIU/ML

## 2022-03-01 ENCOUNTER — APPOINTMENT (OUTPATIENT)
Dept: RADIOLOGY | Facility: CLINIC | Age: 62
End: 2022-03-01
Payer: MEDICAID

## 2022-03-01 ENCOUNTER — APPOINTMENT (OUTPATIENT)
Dept: CT IMAGING | Facility: CLINIC | Age: 62
End: 2022-03-01
Payer: MEDICAID

## 2022-03-01 PROCEDURE — 77080 DXA BONE DENSITY AXIAL: CPT

## 2022-03-01 PROCEDURE — 70491 CT SOFT TISSUE NECK W/DYE: CPT

## 2022-05-04 ENCOUNTER — RX RENEWAL (OUTPATIENT)
Age: 62
End: 2022-05-04

## 2022-05-09 ENCOUNTER — APPOINTMENT (OUTPATIENT)
Dept: INTERNAL MEDICINE | Facility: CLINIC | Age: 62
End: 2022-05-09
Payer: MEDICAID

## 2022-05-09 VITALS
WEIGHT: 139 LBS | DIASTOLIC BLOOD PRESSURE: 77 MMHG | HEART RATE: 69 BPM | BODY MASS INDEX: 26.24 KG/M2 | OXYGEN SATURATION: 96 % | SYSTOLIC BLOOD PRESSURE: 115 MMHG | TEMPERATURE: 98.2 F | HEIGHT: 61 IN

## 2022-05-09 DIAGNOSIS — R05.9 COUGH, UNSPECIFIED: ICD-10-CM

## 2022-05-09 DIAGNOSIS — J34.89 OTHER SPECIFIED DISORDERS OF NOSE AND NASAL SINUSES: ICD-10-CM

## 2022-05-09 PROCEDURE — 99213 OFFICE O/P EST LOW 20 MIN: CPT

## 2022-05-09 NOTE — HISTORY OF PRESENT ILLNESS
[FreeTextEntry1] : issues [de-identified] : 2 wks ago developed rhinorhea, cough, occular drainage\par covid neg 4/28\par same meds\par reviewed labs

## 2022-05-09 NOTE — PHYSICAL EXAM
[Normal] : normal rate, regular rhythm, normal S1 and S2 and no murmur heard [Normal Oropharynx] : the oropharynx was normal [de-identified] : ?sinus tenderness

## 2022-06-10 ENCOUNTER — APPOINTMENT (OUTPATIENT)
Dept: RHEUMATOLOGY | Facility: CLINIC | Age: 62
End: 2022-06-10

## 2022-07-01 ENCOUNTER — APPOINTMENT (OUTPATIENT)
Dept: RHEUMATOLOGY | Facility: CLINIC | Age: 62
End: 2022-07-01

## 2022-07-20 ENCOUNTER — APPOINTMENT (OUTPATIENT)
Dept: INTERNAL MEDICINE | Facility: CLINIC | Age: 62
End: 2022-07-20

## 2022-07-20 VITALS
HEIGHT: 61 IN | SYSTOLIC BLOOD PRESSURE: 122 MMHG | WEIGHT: 146.25 LBS | OXYGEN SATURATION: 98 % | BODY MASS INDEX: 27.61 KG/M2 | TEMPERATURE: 98 F | DIASTOLIC BLOOD PRESSURE: 72 MMHG | HEART RATE: 63 BPM

## 2022-07-20 DIAGNOSIS — R20.0 ANESTHESIA OF SKIN: ICD-10-CM

## 2022-07-20 PROCEDURE — 36415 COLL VENOUS BLD VENIPUNCTURE: CPT

## 2022-07-20 PROCEDURE — 99214 OFFICE O/P EST MOD 30 MIN: CPT | Mod: 25

## 2022-07-20 NOTE — HISTORY OF PRESENT ILLNESS
[FreeTextEntry1] : joint pain [de-identified] : generalized joint pain\par only on thyroid replacement--endo note reviewed\par rash--her derm req labs\par numbness right hand with activity

## 2022-07-20 NOTE — PHYSICAL EXAM
[Normal] : soft, non-tender, non-distended, no masses palpated, no HSM and normal bowel sounds [de-identified] : + tinnels right hand--no localizable joint pain

## 2022-07-21 LAB
25(OH)D3 SERPL-MCNC: 31.6 NG/ML
ALBUMIN SERPL ELPH-MCNC: 4.4 G/DL
ALP BLD-CCNC: 73 U/L
ALT SERPL-CCNC: 17 U/L
ANION GAP SERPL CALC-SCNC: 12 MMOL/L
AST SERPL-CCNC: 19 U/L
B BURGDOR AB SER-IMP: NEGATIVE
B BURGDOR IGG+IGM SER QL: 0.05 INDEX
BASOPHILS # BLD AUTO: 0.06 K/UL
BASOPHILS NFR BLD AUTO: 1.2 %
BILIRUB SERPL-MCNC: 0.4 MG/DL
BUN SERPL-MCNC: 10 MG/DL
CALCIUM SERPL-MCNC: 9.4 MG/DL
CHLORIDE SERPL-SCNC: 103 MMOL/L
CO2 SERPL-SCNC: 26 MMOL/L
CREAT SERPL-MCNC: 0.67 MG/DL
CRP SERPL-MCNC: <3 MG/L
EGFR: 98 ML/MIN/1.73M2
EOSINOPHIL # BLD AUTO: 0.2 K/UL
EOSINOPHIL NFR BLD AUTO: 3.9 %
ERYTHROCYTE [SEDIMENTATION RATE] IN BLOOD BY WESTERGREN METHOD: 25 MM/HR
FERRITIN SERPL-MCNC: 118 NG/ML
GLUCOSE SERPL-MCNC: 95 MG/DL
HCT VFR BLD CALC: 45.5 %
HGB BLD-MCNC: 14.6 G/DL
IMM GRANULOCYTES NFR BLD AUTO: 0.2 %
IRON SATN MFR SERPL: 28 %
IRON SERPL-MCNC: 78 UG/DL
LH SERPL-ACNC: 38.3 IU/L
LYMPHOCYTES # BLD AUTO: 1.91 K/UL
LYMPHOCYTES NFR BLD AUTO: 36.9 %
MAN DIFF?: NORMAL
MCHC RBC-ENTMCNC: 31.2 PG
MCHC RBC-ENTMCNC: 32.1 GM/DL
MCV RBC AUTO: 97.2 FL
MONOCYTES # BLD AUTO: 0.26 K/UL
MONOCYTES NFR BLD AUTO: 5 %
NEUTROPHILS # BLD AUTO: 2.74 K/UL
NEUTROPHILS NFR BLD AUTO: 52.8 %
PLATELET # BLD AUTO: 259 K/UL
POTASSIUM SERPL-SCNC: 4.4 MMOL/L
PROLACTIN SERPL-MCNC: 6.9 NG/ML
PROT SERPL-MCNC: 6.8 G/DL
RBC # BLD: 4.68 M/UL
RBC # FLD: 13.4 %
RHEUMATOID FACT SER QL: <10 IU/ML
SODIUM SERPL-SCNC: 141 MMOL/L
TIBC SERPL-MCNC: 276 UG/DL
UIBC SERPL-MCNC: 197 UG/DL
WBC # FLD AUTO: 5.18 K/UL

## 2022-07-22 LAB
ENA SCL70 IGG SER IA-ACNC: <0.2 AL
ENA SS-A AB SER IA-ACNC: <0.2 AL
ENA SS-B AB SER IA-ACNC: <0.2 AL

## 2022-07-25 LAB
TESTOST FREE SERPL-MCNC: 1.6 PG/ML
TESTOST SERPL-MCNC: 16.8 NG/DL

## 2022-07-26 NOTE — REVIEW OF SYSTEMS
[Fatigue] : fatigue [Itching] : Itching [Skin Rash] : skin rash [Chills] : no chills [Fever] : no fever [Discharge] : no discharge [Night Sweats] : no night sweats [Pain] : no pain [Redness] : no redness [Vision Problems] : no vision problems [Itching] : no itching [Earache] : no earache [Hearing Loss] : no hearing loss [Nasal Discharge] : no nasal discharge [Chest Pain] : no chest pain [Sore Throat] : no sore throat [Palpitations] : no palpitations [Orthopnea] : no orthopnea [Leg Claudication] : no leg claudication [Paroysmal Nocturnal Dyspnea] : no paroysmal nocturnal dyspnea [Shortness Of Breath] : no shortness of breath [Wheezing] : no wheezing [Cough] : no cough [Abdominal Pain] : no abdominal pain [Nausea] : no nausea [Vomiting] : no vomiting [Constipation] : no constipation [Melena] : no melena [Heartburn] : no heartburn [Incontinence] : no incontinence [Dysuria] : no dysuria [Nocturia] : no nocturia [Frequency] : no frequency [Hematuria] : no hematuria [Joint Swelling] : no joint swelling [Joint Pain] : no joint pain [Joint Stiffness] : no joint stiffness [Headache] : no headache [Dizziness] : no dizziness [Suicidal] : not suicidal 4 = Moderately ill – overt symptoms causing noticeable, but modest, functional impairment or distress; symptom level may warrant medication [Insomnia] : no insomnia [Anxiety] : no anxiety

## 2022-07-28 LAB
ANA PAT FLD IF-IMP: ABNORMAL
ANA SER IF-ACNC: ABNORMAL

## 2022-10-07 ENCOUNTER — APPOINTMENT (OUTPATIENT)
Dept: RHEUMATOLOGY | Facility: CLINIC | Age: 62
End: 2022-10-07

## 2022-12-02 ENCOUNTER — APPOINTMENT (OUTPATIENT)
Dept: INTERNAL MEDICINE | Facility: CLINIC | Age: 62
End: 2022-12-02

## 2022-12-02 VITALS
RESPIRATION RATE: 17 BRPM | TEMPERATURE: 98.2 F | HEIGHT: 61 IN | OXYGEN SATURATION: 97 % | WEIGHT: 153.25 LBS | HEART RATE: 71 BPM | BODY MASS INDEX: 28.93 KG/M2

## 2022-12-02 VITALS — SYSTOLIC BLOOD PRESSURE: 130 MMHG | DIASTOLIC BLOOD PRESSURE: 72 MMHG

## 2022-12-02 DIAGNOSIS — Z87.39 PERSONAL HISTORY OF OTHER DISEASES OF THE MUSCULOSKELETAL SYSTEM AND CONNECTIVE TISSUE: ICD-10-CM

## 2022-12-02 DIAGNOSIS — Z86.39 PERSONAL HISTORY OF OTHER ENDOCRINE, NUTRITIONAL AND METABOLIC DISEASE: ICD-10-CM

## 2022-12-02 PROCEDURE — G0008: CPT

## 2022-12-02 PROCEDURE — 99213 OFFICE O/P EST LOW 20 MIN: CPT | Mod: 25

## 2022-12-02 PROCEDURE — 90686 IIV4 VACC NO PRSV 0.5 ML IM: CPT

## 2022-12-02 NOTE — HISTORY OF PRESENT ILLNESS
[FreeTextEntry1] : f/u thyroid [de-identified] : limited historian\par extensive labs reviewed with pt from July\par off thyroid meds for 1 wk\par simillar arthalgias--apparently hasn’t seen rheum

## 2023-02-01 ENCOUNTER — OFFICE (OUTPATIENT)
Dept: URBAN - METROPOLITAN AREA CLINIC 93 | Facility: CLINIC | Age: 63
Setting detail: OPHTHALMOLOGY
End: 2023-02-01
Payer: COMMERCIAL

## 2023-02-01 ENCOUNTER — RX ONLY (RX ONLY)
Age: 63
End: 2023-02-01

## 2023-02-01 VITALS — HEIGHT: 62 IN

## 2023-02-01 DIAGNOSIS — H52.4: ICD-10-CM

## 2023-02-01 DIAGNOSIS — H18.613: ICD-10-CM

## 2023-02-01 PROBLEM — H52.7 REFRACTIVE ERROR: Status: ACTIVE | Noted: 2023-02-01

## 2023-02-01 PROCEDURE — 92015 DETERMINE REFRACTIVE STATE: CPT | Performed by: OPHTHALMOLOGY

## 2023-02-01 PROCEDURE — 92025 CPTRIZED CORNEAL TOPOGRAPHY: CPT | Performed by: OPHTHALMOLOGY

## 2023-02-01 PROCEDURE — 92002 INTRM OPH EXAM NEW PATIENT: CPT | Performed by: OPHTHALMOLOGY

## 2023-02-01 ASSESSMENT — VISUAL ACUITY
OS_BCVA: 20/70-2
OD_BCVA: 20/40+2

## 2023-02-01 ASSESSMENT — KERATOMETRY
OD_K1POWER_DIOPTERS: 43.75
OS_K1POWER_DIOPTERS: 45.75
OS_AXISANGLE_DEGREES: 088
OD_AXISANGLE_DEGREES: 062
OD_K2POWER_DIOPTERS: 44.00
OS_K2POWER_DIOPTERS: 46.50
METHOD_AUTO_MANUAL: AUTO

## 2023-02-01 ASSESSMENT — CONFRONTATIONAL VISUAL FIELD TEST (CVF)
OS_FINDINGS: FULL
OD_FINDINGS: FULL

## 2023-03-31 ENCOUNTER — APPOINTMENT (OUTPATIENT)
Dept: INTERNAL MEDICINE | Facility: CLINIC | Age: 63
End: 2023-03-31

## 2023-04-17 ENCOUNTER — APPOINTMENT (OUTPATIENT)
Dept: RHEUMATOLOGY | Facility: CLINIC | Age: 63
End: 2023-04-17
Payer: MEDICAID

## 2023-04-17 VITALS
TEMPERATURE: 98 F | SYSTOLIC BLOOD PRESSURE: 114 MMHG | BODY MASS INDEX: 27.94 KG/M2 | HEART RATE: 68 BPM | WEIGHT: 148 LBS | DIASTOLIC BLOOD PRESSURE: 72 MMHG | HEIGHT: 61 IN | OXYGEN SATURATION: 98 % | RESPIRATION RATE: 15 BRPM

## 2023-04-17 PROCEDURE — 99205 OFFICE O/P NEW HI 60 MIN: CPT

## 2023-04-17 RX ORDER — AZITHROMYCIN 250 MG/1
250 TABLET, FILM COATED ORAL
Qty: 1 | Refills: 0 | Status: COMPLETED | COMMUNITY
Start: 2022-05-09 | End: 2023-04-17

## 2023-04-17 NOTE — PHYSICAL EXAM
[General Appearance - Alert] : alert [General Appearance - In No Acute Distress] : in no acute distress [Sclera] : the sclera and conjunctiva were normal [Outer Ear] : the ears and nose were normal in appearance [Oropharynx] : the oropharynx was normal [Neck Appearance] : the appearance of the neck was normal [Neck Cervical Mass (___cm)] : no neck mass was observed [Jugular Venous Distention Increased] : there was no jugular-venous distention [Thyroid Diffuse Enlargement] : the thyroid was not enlarged [Thyroid Nodule] : there were no palpable thyroid nodules [Auscultation Breath Sounds / Voice Sounds] : lungs were clear to auscultation bilaterally [Heart Rate And Rhythm] : heart rate was normal and rhythm regular [Heart Sounds] : normal S1 and S2 [Heart Sounds Gallop] : no gallops [Murmurs] : no murmurs [Heart Sounds Pericardial Friction Rub] : no pericardial rub [Edema] : there was no peripheral edema [Bowel Sounds] : normal bowel sounds [Abdomen Soft] : soft [Abdomen Tenderness] : non-tender [Abdomen Mass (___ Cm)] : no abdominal mass palpated [Cervical Lymph Nodes Enlarged Posterior Bilaterally] : posterior cervical [Cervical Lymph Nodes Enlarged Anterior Bilaterally] : anterior cervical [Supraclavicular Lymph Nodes Enlarged Bilaterally] : supraclavicular [Skin Turgor] : normal skin turgor [Skin Color & Pigmentation] : normal skin color and pigmentation [] : no rash [No Focal Deficits] : no focal deficits [Oriented To Time, Place, And Person] : oriented to person, place, and time [Impaired Insight] : insight and judgment were intact [Affect] : the affect was normal [FreeTextEntry1] : No synovitis;  (+)tenderness in ultiple B/L DIP's, PIP's, and MCP's;  normal ROM in all joints

## 2023-04-17 NOTE — HISTORY OF PRESENT ILLNESS
[Arthralgias] : arthralgias [FreeTextEntry1] : 62 year old female with PMHx as listed below reports that she has been experiencing pain in "all the joints" for the past 2 years.  The pain is intermittent, though it occurs both with activity and at rest.  She describes the pain as sharp, 7 out of 10.  (+)swelling in hands and shoulders.  (+)AM stiffness, usually lasting about 30 minutes.  She gets some relief from Tylenol.  No other known alleviating factors.\par Pt also c/o intermittent burning and itchiness in her B/L LE's, worse with contact with any covering (e.g stockings, pants).  She saw dermatology, who ordered w/u, which revealed a low-positive KMI.\par No F/C, no unintentional weight loss, no night sweats, no oral ulcers, no rashes, no alopecia, no photosensitivity, no dry eyes/dry mouth, no Raynaud symptoms, no focal weakness, no dysphagia  [Anorexia] : no anorexia [Weight Loss] : no weight loss [Malaise] : no malaise [Fever] : no fever [Chills] : no chills [Fatigue] : no fatigue [Malar Facial Rash] : no malar facial rash [Skin Lesions] : no lesions [Skin Nodules] : no skin nodules [Oral Ulcers] : no oral ulcers [Cough] : no cough [Dry Mouth] : no dry mouth [Dysphonia] : no dysphonia [Dysphagia] : no dysphagia [Shortness of Breath] : no shortness of breath [Chest Pain] : no chest pain [Joint Swelling] : no joint swelling [Joint Warmth] : no joint warmth [Joint Deformity] : no joint deformity [Decreased ROM] : no decreased range of motion [Morning Stiffness] : no morning stiffness [Falls] : no falls [Dyspnea] : no dyspnea [Myalgias] : no myalgias [Muscle Weakness] : no muscle weakness [Muscle Spasms] : no muscle spasms [Muscle Cramping] : no muscle cramping [Visual Changes] : no visual changes [Eye Pain] : no eye pain [Eye Redness] : no eye redness [Dry Eyes] : dry eyes

## 2023-04-17 NOTE — CONSULT LETTER
[Dear  ___] : Dear  [unfilled], [Consult Letter:] : I had the pleasure of evaluating your patient, [unfilled]. [Please see my note below.] : Please see my note below. [Consult Closing:] : Thank you very much for allowing me to participate in the care of this patient.  If you have any questions, please do not hesitate to contact me. [Sincerely,] : Sincerely, [FreeTextEntry3] : Yusuf To MD\par Rheumatology\par [facility]\par  of Medicine\par [facility] \par \par [address]\[place] [address]\par \par [address]\[place] [address]\par \par [address] [place] [address]\par \par phone:  [phone]\par fax:      [phone]\par

## 2023-04-17 NOTE — ASSESSMENT
[FreeTextEntry1] : 62 year old female presents with polyarticular joint pains.  Her overall presentation is most suggestive of osteoarthritis.  However, ,as she also c/o intermittent swelling in her hands and AM stiffness x 30 minutes, I am ordering workup to rule out a concurrent inflammatory arthritis.  She has also previously been found to have a low-positive KIM, though she does not exhibit any obvious signs/symptoms of an underlying connective tissue disorder, other than dry eyes which can occur in Sjogren's Syndrome but is very non-specific.  I have therefore ordered some more bloodwork and x-rays as further workup. She will follow up with me again in 2-3 weeks to review her results.   In the meantime, she will continue taking Tylenol prn as it helps to alleviate her symptoms.

## 2023-04-18 LAB
ALBUMIN SERPL ELPH-MCNC: 4.4 G/DL
ALP BLD-CCNC: 75 U/L
ALT SERPL-CCNC: 20 U/L
ANION GAP SERPL CALC-SCNC: 12 MMOL/L
AST SERPL-CCNC: 20 U/L
BASOPHILS # BLD AUTO: 0.08 K/UL
BASOPHILS NFR BLD AUTO: 1.6 %
BILIRUB SERPL-MCNC: 0.5 MG/DL
BUN SERPL-MCNC: 18 MG/DL
C3 SERPL-MCNC: 116 MG/DL
C4 SERPL-MCNC: 29 MG/DL
CALCIUM SERPL-MCNC: 9.3 MG/DL
CCP AB SER IA-ACNC: <8 UNITS
CHLORIDE SERPL-SCNC: 103 MMOL/L
CO2 SERPL-SCNC: 24 MMOL/L
CREAT SERPL-MCNC: 0.68 MG/DL
CRP SERPL-MCNC: <3 MG/L
DSDNA AB SER-ACNC: <12 IU/ML
EGFR: 97 ML/MIN/1.73M2
EOSINOPHIL # BLD AUTO: 0.08 K/UL
EOSINOPHIL NFR BLD AUTO: 1.6 %
ERYTHROCYTE [SEDIMENTATION RATE] IN BLOOD BY WESTERGREN METHOD: 19 MM/HR
GLUCOSE SERPL-MCNC: 94 MG/DL
HCT VFR BLD CALC: 45.2 %
HGB BLD-MCNC: 15 G/DL
IMM GRANULOCYTES NFR BLD AUTO: 0.2 %
LYMPHOCYTES # BLD AUTO: 1.58 K/UL
LYMPHOCYTES NFR BLD AUTO: 31.3 %
MAN DIFF?: NORMAL
MCHC RBC-ENTMCNC: 30.9 PG
MCHC RBC-ENTMCNC: 33.2 GM/DL
MCV RBC AUTO: 93.2 FL
MONOCYTES # BLD AUTO: 0.33 K/UL
MONOCYTES NFR BLD AUTO: 6.5 %
NEUTROPHILS # BLD AUTO: 2.97 K/UL
NEUTROPHILS NFR BLD AUTO: 58.8 %
PLATELET # BLD AUTO: 292 K/UL
POTASSIUM SERPL-SCNC: 4 MMOL/L
PROT SERPL-MCNC: 7 G/DL
RBC # BLD: 4.85 M/UL
RBC # FLD: 13.2 %
RF+CCP IGG SER-IMP: NEGATIVE
SODIUM SERPL-SCNC: 139 MMOL/L
THYROGLOB AB SERPL-ACNC: <20 IU/ML
THYROPEROXIDASE AB SERPL IA-ACNC: <10 IU/ML
WBC # FLD AUTO: 5.05 K/UL

## 2023-04-19 LAB — ACE BLD-CCNC: 60 U/L

## 2023-04-20 LAB
ENA RNP AB SER IA-ACNC: 0.2 AL
ENA SM AB SER IA-ACNC: <0.2 AL
ENA SS-A AB SER IA-ACNC: <0.2 AL
ENA SS-B AB SER IA-ACNC: <0.2 AL

## 2023-05-05 NOTE — PACU DISCHARGE NOTE - HYDRATION STATUS:
Annual chart review completed.          Patient is up to date with labs and appointments.         
Satisfactory

## 2023-05-08 ENCOUNTER — APPOINTMENT (OUTPATIENT)
Dept: RHEUMATOLOGY | Facility: CLINIC | Age: 63
End: 2023-05-08
Payer: MEDICAID

## 2023-05-08 VITALS
OXYGEN SATURATION: 99 % | HEIGHT: 61 IN | SYSTOLIC BLOOD PRESSURE: 127 MMHG | HEART RATE: 70 BPM | DIASTOLIC BLOOD PRESSURE: 81 MMHG | WEIGHT: 148 LBS | BODY MASS INDEX: 27.94 KG/M2

## 2023-05-08 PROCEDURE — 99214 OFFICE O/P EST MOD 30 MIN: CPT

## 2023-05-08 NOTE — ASSESSMENT
[FreeTextEntry1] : 62 year old female presents with diffuse pain, including both joints and muscles.  Some of her joint pains are due to OA, though given diffuse pain, suspect fibromyalgia and/or small fiber neuropathy.  Pt also found to have a low-positive KIM, though she does not exhibit any obvious signs/symptoms of an underlying CTD and all sub-serologies negative.\par   - Reiterated importance of exercise\par   - Cont Tylenol prn\par   - heating pads or ice packs\par   - OTC topical analgesics\par   - Derm f/u  for skin bx - r/o small fiber neuropathy\par

## 2023-05-08 NOTE — PHYSICAL EXAM
[General Appearance - Alert] : alert [General Appearance - In No Acute Distress] : in no acute distress [Sclera] : the sclera and conjunctiva were normal [Outer Ear] : the ears and nose were normal in appearance [Oropharynx] : the oropharynx was normal [Neck Appearance] : the appearance of the neck was normal [Neck Cervical Mass (___cm)] : no neck mass was observed [Jugular Venous Distention Increased] : there was no jugular-venous distention [Thyroid Diffuse Enlargement] : the thyroid was not enlarged [Thyroid Nodule] : there were no palpable thyroid nodules [Auscultation Breath Sounds / Voice Sounds] : lungs were clear to auscultation bilaterally [Heart Rate And Rhythm] : heart rate was normal and rhythm regular [Heart Sounds] : normal S1 and S2 [Heart Sounds Gallop] : no gallops [Murmurs] : no murmurs [Heart Sounds Pericardial Friction Rub] : no pericardial rub [Edema] : there was no peripheral edema [Bowel Sounds] : normal bowel sounds [Abdomen Soft] : soft [Abdomen Tenderness] : non-tender [Abdomen Mass (___ Cm)] : no abdominal mass palpated [Cervical Lymph Nodes Enlarged Posterior Bilaterally] : posterior cervical [Cervical Lymph Nodes Enlarged Anterior Bilaterally] : anterior cervical [Supraclavicular Lymph Nodes Enlarged Bilaterally] : supraclavicular [Skin Color & Pigmentation] : normal skin color and pigmentation [Skin Turgor] : normal skin turgor [] : no rash [No Focal Deficits] : no focal deficits [Oriented To Time, Place, And Person] : oriented to person, place, and time [Impaired Insight] : insight and judgment were intact [Affect] : the affect was normal [FreeTextEntry1] : No synovitis;  (+)tenderness in multiple B/L DIP's, PIP's, and MCP's;  normal ROM in all joints

## 2023-05-08 NOTE — HISTORY OF PRESENT ILLNESS
[Arthralgias] : arthralgias [Dry Eyes] : dry eyes [FreeTextEntry1] : Feeling "the same" since last visit.  Still w/ diffuse pain in "all the joints", though she now reports that the pain is also in the muscles (entire UE's and LE's) as well.  Also w/ frequent headaches. [Anorexia] : no anorexia [Weight Loss] : no weight loss [Malaise] : no malaise [Fever] : no fever [Chills] : no chills [Fatigue] : no fatigue [Malar Facial Rash] : no malar facial rash [Skin Lesions] : no lesions [Skin Nodules] : no skin nodules [Oral Ulcers] : no oral ulcers [Cough] : no cough [Dry Mouth] : no dry mouth [Dysphonia] : no dysphonia [Dysphagia] : no dysphagia [Shortness of Breath] : no shortness of breath [Chest Pain] : no chest pain [Joint Swelling] : no joint swelling [Joint Warmth] : no joint warmth [Decreased ROM] : no decreased range of motion [Joint Deformity] : no joint deformity [Morning Stiffness] : no morning stiffness [Dyspnea] : no dyspnea [Falls] : no falls [Myalgias] : no myalgias [Muscle Weakness] : no muscle weakness [Muscle Spasms] : no muscle spasms [Muscle Cramping] : no muscle cramping [Visual Changes] : no visual changes [Eye Pain] : no eye pain [Eye Redness] : no eye redness

## 2023-05-08 NOTE — DATA REVIEWED
[FreeTextEntry1] : x-rays of B/L knees:  unremarkable\par x-rays of B/L feet:  OA of B/L 1st MTP's, surgical changes

## 2023-06-20 ENCOUNTER — RX RENEWAL (OUTPATIENT)
Age: 63
End: 2023-06-20

## 2023-07-12 ENCOUNTER — APPOINTMENT (OUTPATIENT)
Dept: INTERNAL MEDICINE | Facility: CLINIC | Age: 63
End: 2023-07-12
Payer: MEDICAID

## 2023-07-12 ENCOUNTER — NON-APPOINTMENT (OUTPATIENT)
Age: 63
End: 2023-07-12

## 2023-07-12 VITALS
BODY MASS INDEX: 28.51 KG/M2 | WEIGHT: 151 LBS | TEMPERATURE: 98.1 F | DIASTOLIC BLOOD PRESSURE: 77 MMHG | SYSTOLIC BLOOD PRESSURE: 128 MMHG | HEIGHT: 61 IN | HEART RATE: 68 BPM | OXYGEN SATURATION: 97 %

## 2023-07-12 DIAGNOSIS — Z00.00 ENCOUNTER FOR GENERAL ADULT MEDICAL EXAMINATION W/OUT ABNORMAL FINDINGS: ICD-10-CM

## 2023-07-12 DIAGNOSIS — K59.00 CONSTIPATION, UNSPECIFIED: ICD-10-CM

## 2023-07-12 DIAGNOSIS — H93.19 TINNITUS, UNSPECIFIED EAR: ICD-10-CM

## 2023-07-12 PROCEDURE — 99396 PREV VISIT EST AGE 40-64: CPT | Mod: 25

## 2023-07-12 PROCEDURE — 93000 ELECTROCARDIOGRAM COMPLETE: CPT

## 2023-07-12 NOTE — HISTORY OF PRESENT ILLNESS
[FreeTextEntry1] : cpx [de-identified] : reviewed rheum notes and extensive labs\par on armour--sched to see endo\par  worsening  constipation--pt unsure of last colon\par similar chronic joint complaints\par tinnitus

## 2023-07-12 NOTE — HEALTH RISK ASSESSMENT
[With Family] : lives with family [Patient reported mammogram was normal] : Patient reported mammogram was normal [MammogramDate] : 2/23

## 2023-07-13 LAB
APPEARANCE: CLEAR
BACTERIA: NEGATIVE /HPF
BILIRUBIN URINE: NEGATIVE
BLOOD URINE: NEGATIVE
CAST: 0 /LPF
COLOR: YELLOW
EPITHELIAL CELLS: 2 /HPF
GLUCOSE QUALITATIVE U: NEGATIVE MG/DL
KETONES URINE: NEGATIVE MG/DL
LEUKOCYTE ESTERASE URINE: ABNORMAL
MICROSCOPIC-UA: NORMAL
NITRITE URINE: NEGATIVE
PH URINE: 6.5
PROTEIN URINE: NEGATIVE MG/DL
RED BLOOD CELLS URINE: NORMAL /HPF
REVIEW: NORMAL
SPECIFIC GRAVITY URINE: 1.02
UROBILINOGEN URINE: 1 MG/DL
WHITE BLOOD CELLS URINE: 2 /HPF

## 2023-07-28 ENCOUNTER — APPOINTMENT (OUTPATIENT)
Dept: ENDOCRINOLOGY | Facility: CLINIC | Age: 63
End: 2023-07-28
Payer: MEDICAID

## 2023-07-28 VITALS
HEIGHT: 61 IN | TEMPERATURE: 97.6 F | WEIGHT: 149 LBS | DIASTOLIC BLOOD PRESSURE: 62 MMHG | OXYGEN SATURATION: 98 % | HEART RATE: 67 BPM | SYSTOLIC BLOOD PRESSURE: 112 MMHG | RESPIRATION RATE: 16 BRPM | BODY MASS INDEX: 28.13 KG/M2

## 2023-07-28 DIAGNOSIS — R59.1 GENERALIZED ENLARGED LYMPH NODES: ICD-10-CM

## 2023-07-28 DIAGNOSIS — M85.80 OTHER SPECIFIED DISORDERS OF BONE DENSITY AND STRUCTURE, UNSPECIFIED SITE: ICD-10-CM

## 2023-07-28 PROCEDURE — 99214 OFFICE O/P EST MOD 30 MIN: CPT

## 2023-07-28 NOTE — ASSESSMENT
[FreeTextEntry1] : 1. Hypothyroidism\par - adherence is reiterated\par - repeat thyroid panel in ab out a month since she just resumed meds\par - continue [place] thyroid 30 mg qd, pending results\par - we discussed R+B of the T3/T4 combination\par \par 2. H/o suspicious left neck JANELLE\par - negative CT neck\par - repeat thyroid/neck US\par \par 3. Osteopenia with relatively low FRAX score\par - DXA 3 sites in 3/24\par - calcium 500 mg bid, add extra OTC vitamin D3 2,000 IU/day\par - continue weight-bearing exercises; advised avoiding high risk sports\par \par RTC 3-6 months, or sooner prn\par

## 2023-07-28 NOTE — HISTORY OF PRESENT ILLNESS
[FreeTextEntry1] : 62 year female  f/u for hypothyroidism management. \par \par *** Jul 28, 2023 ***\par \par Did not follow-up post initial visit.\par feels well overall. \par was off [place]  for more than a month, resumed 2 weeks ago only\par Currently on [place] thyroid 30 mg qd, OTC calcium/D supplements (not aware of the dose)\par \par DEXA (3/1/2022)–lumbar spine (-2.3), femoral neck (-1.5).  FRAX–4.6% and 0.4%\par CT scan (3/1/2022)–no cervical lymphadenopathy or cervical mass.\par \par HPI:\par Ms. MOHAMUD  was diagnosed with hypothyroidism about 20 years ago. On L-thyroxine for some time, later switched to Synthroid, however never felt well on it. She was switched to [place] Thyroid with a significant improvement, and is currently taking 30 mg qd.\par She noticed some difficulties swallowing dry food, not enough saliva production, and is concerned that it might be related to her thyroid. She saw a GI in the past with a normal work up. She was recently diagnosed with a lupus.\par Last TSH- 1.8\par Denies family history of thyroid cancer or history of radiation exposure to head and neck area in a childhood.\par Her daughter is also treated for hypothyroidism. \par Thyroid US (in-office, 2/18/22)- small, heterogenous thyroid. No nodules. left lateral, level III 0.9x0.5x0.7 cm LN, oval in shape, no hilum with thickened walls and possible calcifications

## 2023-08-02 ENCOUNTER — OFFICE (OUTPATIENT)
Facility: LOCATION | Age: 63
Setting detail: OPHTHALMOLOGY
End: 2023-08-02
Payer: COMMERCIAL

## 2023-08-02 ENCOUNTER — RX ONLY (RX ONLY)
Age: 63
End: 2023-08-02
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
## 2023-08-02 DIAGNOSIS — H16.223: ICD-10-CM

## 2023-08-02 DIAGNOSIS — H18.613: ICD-10-CM

## 2023-08-02 DIAGNOSIS — H16.8: ICD-10-CM

## 2023-08-02 DIAGNOSIS — H02.89: ICD-10-CM

## 2023-08-02 PROBLEM — H52.7 REFRACTIVE ERROR: Status: ACTIVE | Noted: 2023-08-02

## 2023-08-02 PROCEDURE — 92285 EXTERNAL OCULAR PHOTOGRAPHY: CPT | Performed by: OPHTHALMOLOGY

## 2023-08-02 PROCEDURE — 92012 INTRM OPH EXAM EST PATIENT: CPT | Performed by: OPHTHALMOLOGY

## 2023-08-02 ASSESSMENT — KERATOMETRY
OS_K2POWER_DIOPTERS: 46.50
OD_AXISANGLE_DEGREES: 062
METHOD_AUTO_MANUAL: AUTO
METHOD_AUTO_MANUAL: AUTO
OD_K2POWER_DIOPTERS: 44.25
OS_AXISANGLE_DEGREES: 098
OS_AXISANGLE_DEGREES: 088
OD_K1POWER_DIOPTERS: 43.75
OD_K1POWER_DIOPTERS: 44.00
OS_K1POWER_DIOPTERS: 45.75
OS_K2POWER_DIOPTERS: 47.75
OD_K2POWER_DIOPTERS: 44.00
OS_K1POWER_DIOPTERS: 46.50
OD_AXISANGLE_DEGREES: 078

## 2023-08-02 ASSESSMENT — SPHEQUIV_DERIVED
OD_SPHEQUIV: 1.25
OS_SPHEQUIV: -0.25
OS_SPHEQUIV: -0.25
OS_SPHEQUIV: 0
OS_SPHEQUIV: 0.25

## 2023-08-02 ASSESSMENT — AXIALLENGTH_DERIVED
OD_AL: 22.8965
OS_AL: 22.7558
OS_AL: 22.4192
OS_AL: 22.576
OS_AL: 22.3315

## 2023-08-02 ASSESSMENT — REFRACTION_AUTOREFRACTION
OS_AXIS: 105
OS_CYLINDER: -1.00
OD_CYLINDER: -0.50
OD_SPHERE: UNABLE
OS_AXIS: 093
OS_SPHERE: +1.00
OD_SPHERE: +1.50
OS_SPHERE: +0.50
OS_CYLINDER: -1.50
OD_AXIS: 097

## 2023-08-02 ASSESSMENT — REFRACTION_CURRENTRX
OD_ADD: +2.50
OD_OVR_VA: 20/
OD_SPHERE: +0.75
OD_VPRISM_DIRECTION: PROGS
OD_CYLINDER: SPH
OS_SPHERE: +0.25
OS_ADD: +2.50
OS_AXIS: 82
OS_OVR_VA: 20/
OS_VPRISM_DIRECTION: PROGS
OS_CYLINDER: -1.00

## 2023-08-02 ASSESSMENT — VISUAL ACUITY
OD_BCVA: 20/25-1
OS_BCVA: 20/20

## 2023-08-02 ASSESSMENT — SUPERFICIAL PUNCTATE KERATITIS (SPK)
OS_SPK: 1+
OS_SPK: 1+
OD_SPK: 1+
OD_SPK: 1+

## 2023-08-02 ASSESSMENT — REFRACTION_MANIFEST
OD_CYLINDER: SPH
OS_AXIS: 085
OS_VA1: 20/30
OD_VA1: 20/25
OD_CYLINDER: SPH
OD_SPHERE: +0.75
OS_SPHERE: +0.25
OS_CYLINDER: -1.00
OS_AXIS: 085
OS_ADD: +2.50
OD_ADD: +2.50
OS_VA1: 20/30
OS_SPHERE: +0.25
OS_CYLINDER: -1.00
OS_ADD: +2.50
OD_ADD: +2.50
OD_VA1: 20/25
OD_SPHERE: +0.75

## 2023-08-02 ASSESSMENT — CONFRONTATIONAL VISUAL FIELD TEST (CVF)
OS_FINDINGS: FULL
OD_FINDINGS: FULL

## 2023-08-02 ASSESSMENT — LID EXAM ASSESSMENTS
OD_MEIBOMITIS: 1+
OS_MEIBOMITIS: 1+

## 2023-08-08 ENCOUNTER — APPOINTMENT (OUTPATIENT)
Dept: ULTRASOUND IMAGING | Facility: CLINIC | Age: 63
End: 2023-08-08
Payer: MEDICAID

## 2023-08-08 PROCEDURE — 76536 US EXAM OF HEAD AND NECK: CPT

## 2023-08-14 ENCOUNTER — NON-APPOINTMENT (OUTPATIENT)
Age: 63
End: 2023-08-14

## 2023-08-14 ENCOUNTER — APPOINTMENT (OUTPATIENT)
Dept: RHEUMATOLOGY | Facility: CLINIC | Age: 63
End: 2023-08-14
Payer: MEDICAID

## 2023-08-14 VITALS
BODY MASS INDEX: 27.94 KG/M2 | WEIGHT: 148 LBS | OXYGEN SATURATION: 95 % | HEART RATE: 78 BPM | HEIGHT: 61 IN | SYSTOLIC BLOOD PRESSURE: 105 MMHG | DIASTOLIC BLOOD PRESSURE: 68 MMHG

## 2023-08-14 PROCEDURE — 99214 OFFICE O/P EST MOD 30 MIN: CPT

## 2023-08-14 NOTE — ASSESSMENT
[FreeTextEntry1] : 62 year old female presents with diffuse pain, including both joints and muscles.  Some of her joint pains are due to OA, though given diffuse pain, suspect fibromyalgia and/or small fiber neuropathy.  Pt also found to have a low-positive KIM, though she does not exhibit any obvious signs/symptoms of an underlying CTD and all sub-serologies negative.   - Reiterated importance of exercise   - Cont Tylenol prn   - heating pads or ice packs   - OTC topical analgesics   - Will attempt to obtain results of recent skin bx from pt's dermatologist.   - Rx Cymbalta 20mg daily.

## 2023-08-14 NOTE — HISTORY OF PRESENT ILLNESS
[Arthralgias] : arthralgias [Dry Eyes] : dry eyes [FreeTextEntry1] : Feeling "the same" since last visit.  Still w/ diffuse pain, both in her joints and muscles, unchanged.  Also still w/ frequent headaches.  No new complaints. [Anorexia] : no anorexia [Weight Loss] : no weight loss [Malaise] : no malaise [Fever] : no fever [Chills] : no chills [Fatigue] : no fatigue [Malar Facial Rash] : no malar facial rash [Skin Lesions] : no lesions [Skin Nodules] : no skin nodules [Oral Ulcers] : no oral ulcers [Cough] : no cough [Dry Mouth] : no dry mouth [Dysphonia] : no dysphonia [Dysphagia] : no dysphagia [Shortness of Breath] : no shortness of breath [Chest Pain] : no chest pain [Joint Swelling] : no joint swelling [Joint Warmth] : no joint warmth [Joint Deformity] : no joint deformity [Decreased ROM] : no decreased range of motion [Morning Stiffness] : no morning stiffness [Falls] : no falls [Dyspnea] : no dyspnea [Myalgias] : no myalgias [Muscle Weakness] : no muscle weakness [Muscle Spasms] : no muscle spasms [Muscle Cramping] : no muscle cramping [Visual Changes] : no visual changes [Eye Pain] : no eye pain [Eye Redness] : no eye redness

## 2023-09-06 NOTE — PRE-ANESTHESIA EVALUATION ADULT - NSANTHAPLANRD_GEN_ALL_CORE
A call was received from this patient requesting the change the Colonoscopy scheduled for 09/29/2023, to 10/06/2023. The patient was advised as follows:       Patient chose to reschedule the Colonoscopy for .10/06/2023 Patient was informed the Colonoscopy Prep instructions received for the canceled Colonoscopy, remains applicable to the rescheduled Colonoscopy. Colonoscopy Prep instructions were reiterated,discussed and explained meticulously in minute,thorough detail. Patient acquiesced understanding of instructions. The patient was offered the opportunity ask any questions about the Colonoscopy procedure and the related Colonoscopy Prep instructions.  Nothing further was discussed.    general

## 2023-09-11 ENCOUNTER — OFFICE (OUTPATIENT)
Facility: LOCATION | Age: 63
Setting detail: OPHTHALMOLOGY
End: 2023-09-11
Payer: COMMERCIAL

## 2023-09-11 DIAGNOSIS — H16.223: ICD-10-CM

## 2023-09-11 DIAGNOSIS — H02.89: ICD-10-CM

## 2023-09-11 DIAGNOSIS — H40.033: ICD-10-CM

## 2023-09-11 DIAGNOSIS — H16.8: ICD-10-CM
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
## 2023-09-11 PROCEDURE — 92133 CPTRZD OPH DX IMG PST SGM ON: CPT | Performed by: OPHTHALMOLOGY

## 2023-09-11 PROCEDURE — 99213 OFFICE O/P EST LOW 20 MIN: CPT | Performed by: OPHTHALMOLOGY

## 2023-09-11 PROCEDURE — 92020 GONIOSCOPY: CPT | Performed by: OPHTHALMOLOGY

## 2023-09-11 ASSESSMENT — CONFRONTATIONAL VISUAL FIELD TEST (CVF)
OS_FINDINGS: FULL
OD_FINDINGS: FULL

## 2023-09-11 ASSESSMENT — PACHYMETRY
OS_CT_CORRECTION: 3
OS_CT_UM: 504
OD_CT_UM: 505
OD_CT_CORRECTION: 3

## 2023-09-11 ASSESSMENT — SUPERFICIAL PUNCTATE KERATITIS (SPK)
OD_SPK: 1+
OS_SPK: 1+

## 2023-09-11 ASSESSMENT — LID EXAM ASSESSMENTS
OS_MEIBOMITIS: 1+
OD_MEIBOMITIS: 1+

## 2023-09-12 ASSESSMENT — KERATOMETRY
OD_AXISANGLE_DEGREES: 078
OS_AXISANGLE_DEGREES: 098
METHOD_AUTO_MANUAL: AUTO
OS_K1POWER_DIOPTERS: 46.50
OS_K2POWER_DIOPTERS: 47.75
OD_K1POWER_DIOPTERS: 44.00
OD_K2POWER_DIOPTERS: 44.25

## 2023-09-12 ASSESSMENT — REFRACTION_CURRENTRX
OD_SPHERE: +0.75
OS_SPHERE: +0.25
OS_ADD: +2.50
OD_CYLINDER: SPH
OS_OVR_VA: 20/
OS_AXIS: 82
OS_CYLINDER: -1.00
OD_VPRISM_DIRECTION: PROGS
OD_ADD: +2.50
OS_VPRISM_DIRECTION: PROGS
OD_OVR_VA: 20/

## 2023-09-12 ASSESSMENT — VISUAL ACUITY
OS_BCVA: 20/20
OD_BCVA: 20/30

## 2023-09-12 ASSESSMENT — REFRACTION_AUTOREFRACTION
OS_SPHERE: +0.50
OS_AXIS: 105
OS_CYLINDER: -1.00
OD_CYLINDER: -0.50
OD_SPHERE: +1.50
OD_AXIS: 097

## 2023-09-12 ASSESSMENT — AXIALLENGTH_DERIVED
OS_AL: 22.4192
OD_AL: 22.8965
OS_AL: 22.3315

## 2023-09-12 ASSESSMENT — REFRACTION_MANIFEST
OD_CYLINDER: SPH
OD_SPHERE: +0.75
OS_AXIS: 085
OS_SPHERE: +0.25
OS_CYLINDER: -1.00
OS_VA1: 20/30
OD_VA1: 20/25
OS_ADD: +2.50
OD_ADD: +2.50

## 2023-09-12 ASSESSMENT — SPHEQUIV_DERIVED
OS_SPHEQUIV: -0.25
OD_SPHEQUIV: 1.25
OS_SPHEQUIV: 0

## 2023-09-25 ENCOUNTER — OFFICE (OUTPATIENT)
Facility: LOCATION | Age: 63
Setting detail: OPHTHALMOLOGY
End: 2023-09-25
Payer: COMMERCIAL

## 2023-09-25 DIAGNOSIS — H40.033: ICD-10-CM

## 2023-09-25 DIAGNOSIS — H02.89: ICD-10-CM
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
## 2023-09-25 PROCEDURE — 66761 REVISION OF IRIS: CPT | Performed by: OPHTHALMOLOGY

## 2023-09-25 ASSESSMENT — SPHEQUIV_DERIVED
OS_SPHEQUIV: 0
OD_SPHEQUIV: 1.25
OS_SPHEQUIV: -0.25

## 2023-09-25 ASSESSMENT — KERATOMETRY
METHOD_AUTO_MANUAL: AUTO
OS_K2POWER_DIOPTERS: 47.75
OD_K2POWER_DIOPTERS: 44.25
OD_K1POWER_DIOPTERS: 44.00
OS_K1POWER_DIOPTERS: 46.50
OD_AXISANGLE_DEGREES: 078
OS_AXISANGLE_DEGREES: 098

## 2023-09-25 ASSESSMENT — REFRACTION_CURRENTRX
OS_VPRISM_DIRECTION: PROGS
OD_VPRISM_DIRECTION: PROGS
OD_CYLINDER: SPH
OS_SPHERE: +0.25
OS_OVR_VA: 20/
OD_ADD: +2.50
OD_OVR_VA: 20/
OS_CYLINDER: -1.00
OS_AXIS: 82
OD_SPHERE: +0.75
OS_ADD: +2.50

## 2023-09-25 ASSESSMENT — REFRACTION_MANIFEST
OS_AXIS: 085
OD_SPHERE: +0.75
OS_ADD: +2.50
OS_VA1: 20/30
OS_CYLINDER: -1.00
OD_CYLINDER: SPH
OD_ADD: +2.50
OS_SPHERE: +0.25
OD_VA1: 20/25

## 2023-09-25 ASSESSMENT — REFRACTION_AUTOREFRACTION
OD_SPHERE: +1.50
OS_CYLINDER: -1.00
OS_AXIS: 105
OD_CYLINDER: -0.50
OD_AXIS: 097
OS_SPHERE: +0.50

## 2023-09-25 ASSESSMENT — SUPERFICIAL PUNCTATE KERATITIS (SPK)
OD_SPK: 1+
OS_SPK: 1+

## 2023-09-25 ASSESSMENT — LID EXAM ASSESSMENTS
OD_MEIBOMITIS: 1+
OS_MEIBOMITIS: 1+

## 2023-09-25 ASSESSMENT — VISUAL ACUITY
OS_BCVA: 20/20
OD_BCVA: 20/30

## 2023-09-25 ASSESSMENT — AXIALLENGTH_DERIVED
OS_AL: 22.3315
OS_AL: 22.4192
OD_AL: 22.8965

## 2023-09-25 ASSESSMENT — CONFRONTATIONAL VISUAL FIELD TEST (CVF)
OD_FINDINGS: FULL
OS_FINDINGS: FULL

## 2023-10-11 ENCOUNTER — OFFICE (OUTPATIENT)
Facility: LOCATION | Age: 63
Setting detail: OPHTHALMOLOGY
End: 2023-10-11
Payer: COMMERCIAL

## 2023-10-11 DIAGNOSIS — H40.033: ICD-10-CM

## 2023-10-11 PROCEDURE — 66761 REVISION OF IRIS: CPT | Mod: RT | Performed by: OPHTHALMOLOGY

## 2023-10-11 ASSESSMENT — REFRACTION_CURRENTRX
OS_OVR_VA: 20/
OD_SPHERE: +0.75
OS_VPRISM_DIRECTION: PROGS
OD_ADD: +2.50
OS_ADD: +2.50
OD_CYLINDER: SPH
OS_CYLINDER: -1.00
OD_OVR_VA: 20/
OD_VPRISM_DIRECTION: PROGS
OS_SPHERE: +0.25
OS_AXIS: 82

## 2023-10-11 ASSESSMENT — LID EXAM ASSESSMENTS
OD_MEIBOMITIS: 1+
OS_MEIBOMITIS: 1+

## 2023-10-11 ASSESSMENT — REFRACTION_MANIFEST
OS_AXIS: 085
OD_ADD: +2.50
OS_VA1: 20/30
OS_ADD: +2.50
OS_SPHERE: +0.25
OD_SPHERE: +0.75
OS_CYLINDER: -1.00
OD_CYLINDER: SPH
OD_VA1: 20/25

## 2023-10-11 ASSESSMENT — VISUAL ACUITY
OD_BCVA: 20/30
OS_BCVA: 20/20

## 2023-10-11 ASSESSMENT — REFRACTION_AUTOREFRACTION
OD_SPHERE: +1.50
OD_CYLINDER: -0.50
OS_SPHERE: +0.50
OS_CYLINDER: -1.00
OS_AXIS: 105
OD_AXIS: 097

## 2023-10-11 ASSESSMENT — KERATOMETRY
OD_K2POWER_DIOPTERS: 44.25
OD_AXISANGLE_DEGREES: 078
OS_AXISANGLE_DEGREES: 098
METHOD_AUTO_MANUAL: AUTO
OS_K2POWER_DIOPTERS: 47.75
OD_K1POWER_DIOPTERS: 44.00
OS_K1POWER_DIOPTERS: 46.50

## 2023-10-11 ASSESSMENT — AXIALLENGTH_DERIVED
OS_AL: 22.3315
OS_AL: 22.4192
OD_AL: 22.8965

## 2023-10-11 ASSESSMENT — SPHEQUIV_DERIVED
OS_SPHEQUIV: 0
OD_SPHEQUIV: 1.25
OS_SPHEQUIV: -0.25

## 2023-10-11 ASSESSMENT — SUPERFICIAL PUNCTATE KERATITIS (SPK)
OD_SPK: 1+
OS_SPK: 1+

## 2023-10-11 ASSESSMENT — CONFRONTATIONAL VISUAL FIELD TEST (CVF)
OS_FINDINGS: FULL
OD_FINDINGS: FULL

## 2023-10-14 ENCOUNTER — OFFICE (OUTPATIENT)
Facility: LOCATION | Age: 63
Setting detail: OPHTHALMOLOGY
End: 2023-10-14
Payer: COMMERCIAL

## 2023-10-14 DIAGNOSIS — H40.033: ICD-10-CM

## 2023-10-14 DIAGNOSIS — H43.393: ICD-10-CM

## 2023-10-14 PROCEDURE — 99024 POSTOP FOLLOW-UP VISIT: CPT | Performed by: OPHTHALMOLOGY

## 2023-10-14 ASSESSMENT — LID EXAM ASSESSMENTS
OS_MEIBOMITIS: 1+
OD_MEIBOMITIS: 1+

## 2023-10-14 ASSESSMENT — REFRACTION_CURRENTRX
OD_VPRISM_DIRECTION: PROGS
OS_OVR_VA: 20/
OS_AXIS: 82
OS_VPRISM_DIRECTION: PROGS
OS_SPHERE: +0.25
OS_ADD: +2.50
OS_CYLINDER: -1.00
OD_OVR_VA: 20/
OD_SPHERE: +0.75
OD_ADD: +2.50
OD_CYLINDER: SPH

## 2023-10-14 ASSESSMENT — VISUAL ACUITY
OD_BCVA: 20/40
OS_BCVA: 20/20

## 2023-10-14 ASSESSMENT — KERATOMETRY
OS_K1POWER_DIOPTERS: 47.00
OD_K2POWER_DIOPTERS: 44.25
OD_AXISANGLE_DEGREES: 099
OD_K1POWER_DIOPTERS: 43.75
METHOD_AUTO_MANUAL: AUTO
OS_K2POWER_DIOPTERS: 46.75
OS_AXISANGLE_DEGREES: 096

## 2023-10-14 ASSESSMENT — REFRACTION_MANIFEST
OD_CYLINDER: SPH
OS_VA1: 20/30
OS_SPHERE: +0.25
OD_SPHERE: +0.75
OS_AXIS: 085
OS_CYLINDER: -1.00
OD_VA1: 20/25
OD_ADD: +2.50
OS_ADD: +2.50

## 2023-10-14 ASSESSMENT — SPHEQUIV_DERIVED
OS_SPHEQUIV: -0.75
OS_SPHEQUIV: -0.25

## 2023-10-14 ASSESSMENT — AXIALLENGTH_DERIVED
OS_AL: 22.5024
OS_AL: 22.681

## 2023-10-14 ASSESSMENT — REFRACTION_AUTOREFRACTION
OS_AXIS: 094
OS_CYLINDER: -1.00
OS_SPHERE: -0.25

## 2023-10-14 ASSESSMENT — PACHYMETRY
OD_CT_UM: 505
OS_CT_CORRECTION: 3
OD_CT_CORRECTION: 3
OS_CT_UM: 504

## 2023-10-14 ASSESSMENT — CONFRONTATIONAL VISUAL FIELD TEST (CVF)
OD_FINDINGS: FULL
OS_FINDINGS: FULL

## 2023-10-14 ASSESSMENT — SUPERFICIAL PUNCTATE KERATITIS (SPK)
OD_SPK: 1+
OS_SPK: 1+

## 2023-10-16 ENCOUNTER — RX RENEWAL (OUTPATIENT)
Age: 63
End: 2023-10-16

## 2023-10-16 RX ORDER — LEVOTHYROXINE, LIOTHYRONINE 19; 4.5 UG/1; UG/1
30 TABLET ORAL
Qty: 90 | Refills: 3 | Status: ACTIVE | COMMUNITY
Start: 2023-10-16 | End: 1900-01-01

## 2023-11-20 ENCOUNTER — APPOINTMENT (OUTPATIENT)
Dept: RHEUMATOLOGY | Facility: CLINIC | Age: 63
End: 2023-11-20
Payer: MEDICAID

## 2023-11-20 DIAGNOSIS — R21 RASH AND OTHER NONSPECIFIC SKIN ERUPTION: ICD-10-CM

## 2023-11-20 DIAGNOSIS — H04.123 DRY EYE SYNDROME OF BILATERAL LACRIMAL GLANDS: ICD-10-CM

## 2023-11-20 DIAGNOSIS — R20.8 OTHER DISTURBANCES OF SKIN SENSATION: ICD-10-CM

## 2023-11-20 DIAGNOSIS — R52 PAIN, UNSPECIFIED: ICD-10-CM

## 2023-11-20 PROCEDURE — 99214 OFFICE O/P EST MOD 30 MIN: CPT

## 2023-12-06 ENCOUNTER — APPOINTMENT (OUTPATIENT)
Dept: ORTHOPEDIC SURGERY | Facility: CLINIC | Age: 63
End: 2023-12-06
Payer: MEDICARE

## 2023-12-06 DIAGNOSIS — G56.02 CARPAL TUNNEL SYNDROME, LEFT UPPER LIMB: ICD-10-CM

## 2023-12-06 DIAGNOSIS — M54.12 RADICULOPATHY, CERVICAL REGION: ICD-10-CM

## 2023-12-06 PROCEDURE — 72040 X-RAY EXAM NECK SPINE 2-3 VW: CPT

## 2023-12-06 PROCEDURE — L3908: CPT | Mod: LT

## 2023-12-06 PROCEDURE — 73120 X-RAY EXAM OF HAND: CPT | Mod: 50

## 2023-12-06 PROCEDURE — 20526 THER INJECTION CARP TUNNEL: CPT | Mod: LT

## 2023-12-06 PROCEDURE — 99204 OFFICE O/P NEW MOD 45 MIN: CPT | Mod: 25

## 2023-12-15 ENCOUNTER — APPOINTMENT (OUTPATIENT)
Age: 63
End: 2023-12-15
Payer: MEDICARE

## 2023-12-15 PROCEDURE — 29848 WRIST ENDOSCOPY/SURGERY: CPT | Mod: RT

## 2023-12-15 RX ORDER — ACETAMINOPHEN AND CODEINE PHOSPHATE 300; 30 MG/1; MG/1
300-30 TABLET ORAL EVERY 6 HOURS
Qty: 12 | Refills: 0 | Status: ACTIVE | COMMUNITY
Start: 2023-12-15 | End: 1900-01-01

## 2023-12-15 RX ORDER — ACETAMINOPHEN AND CODEINE PHOSPHATE 300; 30 MG/1; MG/1
300-30 TABLET ORAL EVERY 6 HOURS
Qty: 12 | Refills: 0 | Status: DISCONTINUED | COMMUNITY
Start: 2023-12-15 | End: 2023-12-15

## 2023-12-27 ENCOUNTER — APPOINTMENT (OUTPATIENT)
Dept: ORTHOPEDIC SURGERY | Facility: CLINIC | Age: 63
End: 2023-12-27
Payer: MEDICARE

## 2023-12-27 VITALS — WEIGHT: 148 LBS | HEIGHT: 61 IN | BODY MASS INDEX: 27.94 KG/M2

## 2023-12-27 DIAGNOSIS — M77.11 LATERAL EPICONDYLITIS, RIGHT ELBOW: ICD-10-CM

## 2023-12-27 DIAGNOSIS — G56.01 CARPAL TUNNEL SYNDROME, RIGHT UPPER LIMB: ICD-10-CM

## 2023-12-27 PROCEDURE — 99024 POSTOP FOLLOW-UP VISIT: CPT

## 2023-12-27 NOTE — HISTORY OF PRESENT ILLNESS
[7] : 7 [6] : 6 [Localized] : localized [Shooting] : shooting [Intermittent] : intermittent [Household chores] : household chores [Rest] : rest [de-identified] : DOS: 12/15/23- RIGHT CARPAL TUNNEL RELEASE  12/27/23:  Pt has improvement in n/t  12/6/2023: pt here with right > left hand tingling/numbness x 2 yrs.  Pt has recently noted difficulty sleeping x 2-3 weeks.  Pt denies hx of trauma to either upper extremity. Ms. Zaragoza complains of right hand weakness.   PMH: hypothyroid, glaucoma, RA. Allergies: NKDA.  [] : no [de-identified] : with activity  [de-identified] : sx

## 2023-12-27 NOTE — IMAGING
[de-identified] : wound clean dry and intact no erythema no drainage FAROM NV improved sutures removed  right elbow: no swelling/ecchymosis ttp over lateral epicondyle farom nvid

## 2023-12-28 ENCOUNTER — APPOINTMENT (OUTPATIENT)
Dept: INTERNAL MEDICINE | Facility: CLINIC | Age: 63
End: 2023-12-28
Payer: MEDICARE

## 2023-12-28 VITALS
DIASTOLIC BLOOD PRESSURE: 57 MMHG | HEART RATE: 83 BPM | WEIGHT: 149.56 LBS | RESPIRATION RATE: 16 BRPM | TEMPERATURE: 98.4 F | SYSTOLIC BLOOD PRESSURE: 110 MMHG | OXYGEN SATURATION: 97 % | BODY MASS INDEX: 28.24 KG/M2 | HEIGHT: 61 IN

## 2023-12-28 DIAGNOSIS — K21.9 GASTRO-ESOPHAGEAL REFLUX DISEASE W/OUT ESOPHAGITIS: ICD-10-CM

## 2023-12-28 DIAGNOSIS — Z23 ENCOUNTER FOR IMMUNIZATION: ICD-10-CM

## 2023-12-28 DIAGNOSIS — B96.81 GASTRITIS, UNSPECIFIED, W/OUT BLEEDING: ICD-10-CM

## 2023-12-28 DIAGNOSIS — K29.70 GASTRITIS, UNSPECIFIED, W/OUT BLEEDING: ICD-10-CM

## 2023-12-28 PROCEDURE — G0009: CPT

## 2023-12-28 PROCEDURE — 90686 IIV4 VACC NO PRSV 0.5 ML IM: CPT

## 2023-12-28 PROCEDURE — 99214 OFFICE O/P EST MOD 30 MIN: CPT | Mod: 25

## 2023-12-28 PROCEDURE — 90677 PCV20 VACCINE IM: CPT

## 2023-12-28 PROCEDURE — 90472 IMMUNIZATION ADMIN EACH ADD: CPT

## 2023-12-28 NOTE — HISTORY OF PRESENT ILLNESS
[FreeTextEntry1] : f/u thyroid, dyspepsia [de-identified] : pt was scheduled for medical clearance but states her surgery has been cancelled reviewed interim specialty notes and meds [place] cymbalta [place] planning on egd

## 2024-02-27 ENCOUNTER — NON-APPOINTMENT (OUTPATIENT)
Age: 64
End: 2024-02-27

## 2024-02-28 ENCOUNTER — EMERGENCY (EMERGENCY)
Facility: HOSPITAL | Age: 64
LOS: 1 days | Discharge: HOME | End: 2024-02-28
Attending: EMERGENCY MEDICINE
Payer: COMMERCIAL

## 2024-02-28 VITALS
WEIGHT: 153 LBS | OXYGEN SATURATION: 96 % | HEIGHT: 63 IN | SYSTOLIC BLOOD PRESSURE: 131 MMHG | RESPIRATION RATE: 18 BRPM | DIASTOLIC BLOOD PRESSURE: 79 MMHG | HEART RATE: 68 BPM | TEMPERATURE: 98 F

## 2024-02-28 DIAGNOSIS — Z98.890 OTHER SPECIFIED POSTPROCEDURAL STATES: Chronic | ICD-10-CM

## 2024-02-28 PROCEDURE — 99284 EMERGENCY DEPT VISIT MOD MDM: CPT | Mod: 25

## 2024-02-28 PROCEDURE — 70450 CT HEAD/BRAIN W/O DYE: CPT | Mod: 26,MC

## 2024-02-28 PROCEDURE — 99284 EMERGENCY DEPT VISIT MOD MDM: CPT

## 2024-02-28 PROCEDURE — 70450 CT HEAD/BRAIN W/O DYE: CPT | Mod: MC

## 2024-02-28 NOTE — ED ADULT NURSE NOTE - OBJECTIVE STATEMENT
63 y.o F sent in from  p/w c/o head injury. A+Ox4. Pt states on Saturday bent down to grab something and hit head on corner of counter causing pain and bruising. Bruise on medial forehead and slight hematoma noted. Tender to palpation. States went to  b/c this AM woke up w SANZ so wanted to get checked.  sent pt to ER for further eval due to new onset HA and age. States took Tylenol at 1430 today w/ relief. Denies any current HA. Pain w/ palpation to bruise. Denies dizziness, lightheadedness, worsening blurry vision (PMH glaucoma), vision changes, unsteady gait, photophobia, CP, SOB. NIH 0. PERRL. PMH glaucoma and hypothyroidism. No other complaints at this time, comfort and safety maintained.

## 2024-02-28 NOTE — ED ADULT NURSE NOTE - NSICDXPASTSURGICALHX_GEN_ALL_CORE_FT
PAST SURGICAL HISTORY:  H/O right inguinal hernia repair     History of bunionectomy of both great toes     Personal history of spine surgery lumbar with hardware    S/P LASIK surgery left eye

## 2024-02-28 NOTE — ED PROVIDER NOTE - NSFOLLOWUPINSTRUCTIONS_ED_ALL_ED_FT
You are seen in the emergency department for head injury.  Your CAT scan shows no internal injuries.    The bump on your head will resolve on its own but may take a couple weeks to completely heal.    You may use Tylenol 650mg every 8 hours or Motrin 600mg every 8 hours as needed for pain. These are over the counter medications.    Return to the emergency department if you develop repetitive vomiting vision changes or for any other concerns.

## 2024-02-28 NOTE — ED ADULT TRIAGE NOTE - CHIEF COMPLAINT QUOTE
fell on Saturday;  struck head on edge of furniture when bending down; no LOC; not on blood thinners; HA began today; went to  and was sent here; has bruise on forehead with minor swelling; no headache now after Tylenol

## 2024-02-28 NOTE — ED PROVIDER NOTE - CLINICAL SUMMARY MEDICAL DECISION MAKING FREE TEXT BOX
Attending MD Pratt: 63-year-old woman with history of thyroid disease suspending is presenting for evaluation of hypothyroidism presenting for evaluation of frontal scalp hematoma and head injury that was sustained on 5 days prior.  Patient was bending over to pick something up and struck her head on the edge of a piece of furniture.  She denies loss of consciousness no nausea no vomiting no vision changes no numbness or tingling of the extremities.  Patient denies any anticoagulant use.  Patient was seen at urgent care and advised to present to ED for further evaluation and consideration of head CT.  Patient was concerned today because she developed new headache, currently headache is resolved with Tylenol.    Patient's vital signs are within normal limits she is sitting in the stretcher no apparent distress.  There is a 3 cm x 2 cm midline forehead scalp hematoma no laceration or abrasion.  Awake and alert. Symmetric eyebrow raise, symmetric eyelid closure. PERRL b/l, EOMI b/l, symmetric smile, tongue midline.  5/5  strength bilaterally, 5/5 elbow flexion bilaterally, 5/5 elbow extension bilaterally, 5/5 shoulder shrug b/l.  5/5 plantar and dorsiflexion b/l, 5/5 knee flexion and extension b/l, 5/5 hip flexion and extension b/l. Sensation intact and symmetric grossly to light touch throughout face and bilateral upper and lower extremities,  Finger to nose normal bilaterally. Steady gait.  Nontender C-spine    Patient with closed head injury and forehead hematoma, given age will obtain CT head to exclude ICH.  Patient was offered analgesia but she declines.

## 2024-02-28 NOTE — ED PROVIDER NOTE - PATIENT PORTAL LINK FT
You can access the FollowMyHealth Patient Portal offered by [facility] by registering at the following website: [email or web address]. By joining [email or web address]’s FollowMyHealth portal, you will also be able to view your health information using other applications (apps) compatible with our system.

## 2024-02-28 NOTE — ED ADULT NURSE NOTE - NSICDXPASTMEDICALHX_GEN_ALL_CORE_FT
PAST MEDICAL HISTORY:  Arthritis     GERD (gastroesophageal reflux disease)     H. pylori infection treated    History of positive PPD treated when pt was in her 20's    Hypothyroidism, unspecified type     Migraine

## 2024-02-28 NOTE — ED ADULT NURSE NOTE - DRUG PRE-SCREENING (DAST -1)
Statement Selected
Follow up with the primary care doctor in 2-3 days.   If you experience any new or worsening symptoms or if you are concerned you can always come back to the emergency for a re-evaluation.  For pain you can take over the counter Ibuprofen 600 mg orally every 6 hours as needed for pain. Take medication with food.    Drink 8-10 glasses (8 oz) per day. When you have a fever you are at higher risk of dehydration.

## 2024-02-29 VITALS
SYSTOLIC BLOOD PRESSURE: 114 MMHG | DIASTOLIC BLOOD PRESSURE: 73 MMHG | TEMPERATURE: 98 F | RESPIRATION RATE: 18 BRPM | HEART RATE: 62 BPM | OXYGEN SATURATION: 96 %

## 2024-02-29 NOTE — ED ADULT NURSE REASSESSMENT NOTE - NS ED NURSE REASSESS COMMENT FT1
Pt resting in bed, VSS, NAD. CTr negative, no internal bleed or injury. Scalp hematoma dx. No complaints at this time, safety maintained, to be d/c.

## 2024-03-07 ENCOUNTER — OUTPATIENT (OUTPATIENT)
Dept: OUTPATIENT SERVICES | Facility: HOSPITAL | Age: 64
LOS: 1 days | End: 2024-03-07
Payer: COMMERCIAL

## 2024-03-07 ENCOUNTER — TRANSCRIPTION ENCOUNTER (OUTPATIENT)
Age: 64
End: 2024-03-07

## 2024-03-07 VITALS
HEART RATE: 58 BPM | DIASTOLIC BLOOD PRESSURE: 59 MMHG | OXYGEN SATURATION: 96 % | SYSTOLIC BLOOD PRESSURE: 112 MMHG | TEMPERATURE: 98 F | RESPIRATION RATE: 22 BRPM | HEIGHT: 62 IN | WEIGHT: 160.06 LBS

## 2024-03-07 VITALS
HEART RATE: 56 BPM | DIASTOLIC BLOOD PRESSURE: 57 MMHG | SYSTOLIC BLOOD PRESSURE: 113 MMHG | OXYGEN SATURATION: 98 % | RESPIRATION RATE: 22 BRPM

## 2024-03-07 DIAGNOSIS — Z98.890 OTHER SPECIFIED POSTPROCEDURAL STATES: Chronic | ICD-10-CM

## 2024-03-07 DIAGNOSIS — K21.9 GASTRO-ESOPHAGEAL REFLUX DISEASE WITHOUT ESOPHAGITIS: ICD-10-CM

## 2024-03-07 DIAGNOSIS — K59.09 OTHER CONSTIPATION: ICD-10-CM

## 2024-03-07 PROCEDURE — 43239 EGD BIOPSY SINGLE/MULTIPLE: CPT

## 2024-03-07 PROCEDURE — 45380 COLONOSCOPY AND BIOPSY: CPT | Mod: XS

## 2024-03-07 PROCEDURE — 88305 TISSUE EXAM BY PATHOLOGIST: CPT | Mod: 26

## 2024-03-07 PROCEDURE — 45385 COLONOSCOPY W/LESION REMOVAL: CPT

## 2024-03-07 PROCEDURE — 88305 TISSUE EXAM BY PATHOLOGIST: CPT

## 2024-03-07 DEVICE — NET RETRV ROT ROTH 2.5MMX230CM: Type: IMPLANTABLE DEVICE | Status: FUNCTIONAL

## 2024-03-07 RX ORDER — FENTANYL CITRATE 50 UG/ML
50 INJECTION INTRAVENOUS
Refills: 0 | Status: DISCONTINUED | OUTPATIENT
Start: 2024-03-07 | End: 2024-03-07

## 2024-03-07 RX ORDER — THYROID 120 MG
0 TABLET ORAL
Refills: 0 | DISCHARGE

## 2024-03-07 RX ORDER — ACETAMINOPHEN 500 MG
1000 TABLET ORAL ONCE
Refills: 0 | Status: DISCONTINUED | OUTPATIENT
Start: 2024-03-07 | End: 2024-03-21

## 2024-03-07 RX ORDER — OXYCODONE HYDROCHLORIDE 5 MG/1
1 TABLET ORAL
Qty: 0 | Refills: 0 | DISCHARGE
Start: 2024-03-07

## 2024-03-07 RX ORDER — OXYCODONE HYDROCHLORIDE 5 MG/1
5 TABLET ORAL ONCE
Refills: 0 | Status: DISCONTINUED | OUTPATIENT
Start: 2024-03-07 | End: 2024-03-07

## 2024-03-07 RX ORDER — PANTOPRAZOLE SODIUM 20 MG/1
1 TABLET, DELAYED RELEASE ORAL
Refills: 0 | DISCHARGE

## 2024-03-07 RX ORDER — SODIUM CHLORIDE 9 MG/ML
500 INJECTION INTRAMUSCULAR; INTRAVENOUS; SUBCUTANEOUS
Refills: 0 | Status: COMPLETED | OUTPATIENT
Start: 2024-03-07 | End: 2024-03-07

## 2024-03-07 RX ORDER — OXYCODONE HYDROCHLORIDE 5 MG/1
10 TABLET ORAL ONCE
Refills: 0 | Status: DISCONTINUED | OUTPATIENT
Start: 2024-03-07 | End: 2024-03-07

## 2024-03-07 RX ORDER — ONDANSETRON 8 MG/1
4 TABLET, FILM COATED ORAL ONCE
Refills: 0 | Status: DISCONTINUED | OUTPATIENT
Start: 2024-03-07 | End: 2024-03-21

## 2024-03-07 RX ORDER — PANTOPRAZOLE SODIUM 20 MG/1
40 TABLET, DELAYED RELEASE ORAL
Refills: 0 | DISCHARGE

## 2024-03-07 RX ADMIN — SODIUM CHLORIDE 30 MILLILITER(S): 9 INJECTION INTRAMUSCULAR; INTRAVENOUS; SUBCUTANEOUS at 08:31

## 2024-03-07 NOTE — ASU PATIENT PROFILE, ADULT - NSICDXPASTSURGICALHX_GEN_ALL_CORE_FT
PAST SURGICAL HISTORY:  H/O right inguinal hernia repair     History of bunionectomy of both great toes     Personal history of spine surgery lumbar with hardware    S/P LASIK surgery left eye     PAST SURGICAL HISTORY:  H/O hand surgery     H/O right inguinal hernia repair     History of bunionectomy of both great toes     Personal history of spine surgery lumbar with hardware    S/P LASIK surgery left eye

## 2024-03-07 NOTE — ASU PREOP CHECKLIST - AS BP NONINV METHOD
Report given to Swati Ortega, [address] x [phone] at 2522. Transport paged. Pt and family updated.
electronic
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
## 2024-03-07 NOTE — PRE PROCEDURE NOTE - PRE PROCEDURE EVALUATION
Attending Physician:   Pamela    Procedure: EGD and colonoscopy    Indication for Procedure: rectal bleeding, diarrhea, and nausea  ________________________________________________________  PAST MEDICAL & SURGICAL HISTORY:  Arthritis      Hypothyroidism, unspecified type      H. pylori infection  treated      GERD (gastroesophageal reflux disease)      Migraine      History of positive PPD  treated when pt was in her 20's      Personal history of spine surgery  lumbar with hardware      H/O right inguinal hernia repair      History of bunionectomy of both great toes      S/P LASIK surgery  left eye      H/O hand surgery        ALLERGIES:  latex (Rash)  No Known Drug Allergies    HOME MEDICATIONS:  pantoprazole 40 mg oral delayed release tablet: 1 tab(s) orally once a day  thyroid:     PHYSICAL EXAMINATION:    Height (cm): 157.5  Weight (kg): 72.6  BMI (kg/m2): 29.3  BSA (m2): 1.74T(C): 36.7  HR: 58  BP: 112/59  RR: 22  SpO2: 96%    Constitutional: NAD  HEENT: Anicteric, EOMI   Neck:  No JVD  Respiratory: Normal respiratory effort, no accessory muscle use  Cardiovascular: S1 and S2, normal rate  Gastrointestinal: BS+, soft, NT/ND  Extremities: No peripheral edema  Neurological: A/O x 3, no focal deficits  Psychiatric: Normal mood, normal affect  Skin: No rashes on exposed skin    COMMENTS:    The patient is a suitable candidate for the planned procedure unless box checked [ ]  No, explain:

## 2024-03-07 NOTE — ASU DISCHARGE PLAN (ADULT/PEDIATRIC) - NS MD DC FALL RISK RISK
For information on Fall & Injury Prevention, visit: [email or web address] OR  [email or web address] OR  https://www.cdc.gov/steadi/patient.html

## 2024-03-12 LAB — SURGICAL PATHOLOGY STUDY: SIGNIFICANT CHANGE UP

## 2024-03-25 ENCOUNTER — APPOINTMENT (OUTPATIENT)
Dept: RHEUMATOLOGY | Facility: CLINIC | Age: 64
End: 2024-03-25

## 2024-03-28 ENCOUNTER — APPOINTMENT (OUTPATIENT)
Dept: SURGERY | Facility: CLINIC | Age: 64
End: 2024-03-28
Payer: MEDICARE

## 2024-03-28 VITALS
OXYGEN SATURATION: 97 % | HEIGHT: 62 IN | DIASTOLIC BLOOD PRESSURE: 65 MMHG | WEIGHT: 150 LBS | SYSTOLIC BLOOD PRESSURE: 117 MMHG | BODY MASS INDEX: 27.6 KG/M2 | TEMPERATURE: 97.1 F | RESPIRATION RATE: 18 BRPM | HEART RATE: 60 BPM

## 2024-03-28 DIAGNOSIS — K64.2 THIRD DEGREE HEMORRHOIDS: ICD-10-CM

## 2024-03-28 PROCEDURE — 99203 OFFICE O/P NEW LOW 30 MIN: CPT | Mod: 25

## 2024-03-28 PROCEDURE — 46221 LIGATION OF HEMORRHOID(S): CPT

## 2024-03-28 RX ORDER — THYROID, PORCINE 30 MG/1
30 TABLET ORAL DAILY
Qty: 90 | Refills: 0 | Status: DISCONTINUED | COMMUNITY
End: 2024-03-28

## 2024-03-28 RX ORDER — DULOXETINE HYDROCHLORIDE 20 MG/1
20 CAPSULE, DELAYED RELEASE PELLETS ORAL
Qty: 30 | Refills: 3 | Status: DISCONTINUED | COMMUNITY
Start: 2023-08-14 | End: 2024-03-28

## 2024-03-28 RX ORDER — TRIAMCINOLONE ACETONIDE 0.5 MG/G
0.05 OINTMENT TOPICAL
Refills: 0 | Status: DISCONTINUED | COMMUNITY
Start: 2023-04-17 | End: 2024-03-28

## 2024-03-28 RX ORDER — MOMETASONE FUROATE 1 MG/ML
0.1 SOLUTION TOPICAL
Refills: 0 | Status: DISCONTINUED | COMMUNITY
Start: 2023-04-17 | End: 2024-03-28

## 2024-03-28 NOTE — CONSULT LETTER
[Dear  ___] : Dear ~DEMOND, [Courtesy Letter:] : I had the pleasure of seeing your patient, [unfilled], in my office today. [Please see my note below.] : Please see my note below. [Consult Closing:] : Thank you very much for allowing me to participate in the care of this patient.  If you have any questions, please do not hesitate to contact me. [Sincerely,] : Sincerely, [DrFritz  ___] : Dr. JOHN [FreeTextEntry2] : Dr. Ronal Santiago [FreeTextEntry3] : Reynold Blanchard M.D., F.SVETLANA.C.S., F.A.S.C.R.S. Chief Colorectal Clinical Services, [facility]

## 2024-03-28 NOTE — PHYSICAL EXAM
[Normal Breath Sounds] : Normal breath sounds [Normal Heart Sounds] : normal heart sounds [Alert] : alert [Oriented to Person] : oriented to person [Oriented to Place] : oriented to place [Oriented to Time] : oriented to time [Calm] : calm [de-identified] : WNL [de-identified] : WNL [de-identified] : ESTEBANL [de-identified] : WNL [de-identified] : WNL [FreeTextEntry1] : Perianal inspection unremarkable.  Redundancy noted on digital exam.  Anoscopy confirmed a large bilobed right posterior hemorrhoid.  A smaller left lateral hemorrhoid was also noted.  Anoscopy performed to evaluate anal canal.  No sedation required.

## 2024-03-28 NOTE — ASSESSMENT
[FreeTextEntry1] : I have seen and evaluated patient and I have corroborated all nursing input into this note.  Patient with prolapsing hemorrhoid which requires manual reduction.  Exam revealed a large bilobed right posterior hemorrhoid.  I recommended rubber band ligation.  Indications, risks, benefits, alternatives reviewed including but not limited to bleeding, infection, and failure.  The patient's daughter was present for the conversation and all questions were answered.  The patient agreed.  2 sets of bands were placed on the right posterior hemorrhoid.  Post band instruction sheet reviewed.  If the patient has any residual symptoms she will return to my office in 1 month for banding of the left lateral hemorrhoid.

## 2024-03-28 NOTE — HISTORY OF PRESENT ILLNESS
[FreeTextEntry1] : Caitlin is a 64 y/o female being seen for consultation, rectal bleeding  Colonoscopy 03/7/24 (chronic diarrhea, hematochezia) - The examined portion of the ileum was normal.  Three 3 to 4 mm polyps in the sigmoid colon, in the transverse colon and in the ascending colon, removed.  Diverticulosis in the sigmoid colon and in the ascending colon.  Non-bleeding internal hemorrhoids.  Random colon biopsies completed.   Pathology: Right colon - Colonic mucosa with no pathologic change.  Negative for active colitis, granuloma and dysplasia.  Ascending colon polyp - Colonic mucosa with focal surface adenomatous change.  Transverse colon polyp - Colonic mucosa with surface adenomatous change.  Sigmoid colon polyp - Colonic mucosa with lymphoid aggregate.  Left colon - Colonic mucosa with no pathologic change.  Negative for active collitis, granuloma and dysplasia.    Today pt reports feeling rectal discomfort.  Soft/hard BMs daily, sometimes straining, RBRPR 3 weeks ago, last episode today into toilet bowl.  Does feel prolapsing tissue or swelling that goes in by itself sometimes she pushes them back in, uses prep H with little relief for more than 6 months.  No episodes of incontinence of stool or flatus, having trouble evacuating stool and flatus.  Good appetite.  No c/o nausea/vomiting.  Denies fever and chills.  Not on anticoagulants.

## 2024-04-19 ENCOUNTER — APPOINTMENT (OUTPATIENT)
Dept: INTERNAL MEDICINE | Facility: CLINIC | Age: 64
End: 2024-04-19

## 2024-04-24 ENCOUNTER — APPOINTMENT (OUTPATIENT)
Dept: INTERNAL MEDICINE | Facility: CLINIC | Age: 64
End: 2024-04-24
Payer: MEDICARE

## 2024-04-24 VITALS
TEMPERATURE: 98.4 F | BODY MASS INDEX: 27.68 KG/M2 | HEIGHT: 62 IN | HEART RATE: 68 BPM | OXYGEN SATURATION: 97 % | WEIGHT: 150.44 LBS | SYSTOLIC BLOOD PRESSURE: 112 MMHG | DIASTOLIC BLOOD PRESSURE: 69 MMHG

## 2024-04-24 DIAGNOSIS — E03.9 HYPOTHYROIDISM, UNSPECIFIED: ICD-10-CM

## 2024-04-24 DIAGNOSIS — M79.7 FIBROMYALGIA: ICD-10-CM

## 2024-04-24 PROCEDURE — G2211 COMPLEX E/M VISIT ADD ON: CPT

## 2024-04-24 PROCEDURE — 99213 OFFICE O/P EST LOW 20 MIN: CPT

## 2024-04-24 NOTE — HISTORY OF PRESENT ILLNESS
Currently heart rate well controlled    Denies chest pain or palpitations  · Metoprolol succinate 50 mg b i d   · Eliquis 5 mg b i d   · Monitor on telemetry [de-identified] : reviewed recent data reviewed rheum notes still with gen joint pain on thyroid replacement [FreeTextEntry1] : f/u rheumatology

## 2024-04-26 ENCOUNTER — LABORATORY RESULT (OUTPATIENT)
Age: 64
End: 2024-04-26

## 2024-04-26 ENCOUNTER — APPOINTMENT (OUTPATIENT)
Dept: RHEUMATOLOGY | Facility: CLINIC | Age: 64
End: 2024-04-26
Payer: MEDICARE

## 2024-04-26 VITALS
DIASTOLIC BLOOD PRESSURE: 78 MMHG | OXYGEN SATURATION: 97 % | SYSTOLIC BLOOD PRESSURE: 113 MMHG | BODY MASS INDEX: 28.51 KG/M2 | WEIGHT: 151 LBS | RESPIRATION RATE: 18 BRPM | HEIGHT: 61 IN | HEART RATE: 62 BPM

## 2024-04-26 DIAGNOSIS — R76.8 OTHER SPECIFIED ABNORMAL IMMUNOLOGICAL FINDINGS IN SERUM: ICD-10-CM

## 2024-04-26 DIAGNOSIS — R21 RASH AND OTHER NONSPECIFIC SKIN ERUPTION: ICD-10-CM

## 2024-04-26 DIAGNOSIS — M25.50 PAIN IN UNSPECIFIED JOINT: ICD-10-CM

## 2024-04-26 PROCEDURE — 99205 OFFICE O/P NEW HI 60 MIN: CPT

## 2024-04-29 ENCOUNTER — APPOINTMENT (OUTPATIENT)
Dept: INTERNAL MEDICINE | Facility: CLINIC | Age: 64
End: 2024-04-29

## 2024-04-30 DIAGNOSIS — R76.8 OTHER SPECIFIED ABNORMAL IMMUNOLOGICAL FINDINGS IN SERUM: ICD-10-CM

## 2024-04-30 LAB
ALBUMIN SERPL ELPH-MCNC: 4.7 G/DL
ALP BLD-CCNC: 80 U/L
ALT SERPL-CCNC: 22 U/L
ANA PAT FLD IF-IMP: NORMAL
ANA SER IF-ACNC: ABNORMAL
ANION GAP SERPL CALC-SCNC: 13 MMOL/L
AST SERPL-CCNC: 23 U/L
BASOPHILS # BLD AUTO: 0.07 K/UL
BASOPHILS NFR BLD AUTO: 1.5 %
BILIRUB SERPL-MCNC: 0.7 MG/DL
BUN SERPL-MCNC: 15 MG/DL
C3 SERPL-MCNC: 121 MG/DL
C4 SERPL-MCNC: 26 MG/DL
CALCIUM SERPL-MCNC: 9.5 MG/DL
CENTROMERE IGG SER-ACNC: <0.2 AL
CH50 SERPL-MCNC: 50 U/ML
CHLORIDE SERPL-SCNC: 102 MMOL/L
CHROMATIN AB SERPL-ACNC: <0.2 AL
CO2 SERPL-SCNC: 24 MMOL/L
CREAT SERPL-MCNC: 0.66 MG/DL
CREAT SPEC-SCNC: 66 MG/DL
CREAT/PROT UR: 0.1 RATIO
CRP SERPL-MCNC: <3 MG/L
DSDNA AB SER-ACNC: 1 IU/ML
EGFR: 97 ML/MIN/1.73M2
ENA RNP AB SER IA-ACNC: 0.2 AL
ENA SCL70 IGG SER IA-ACNC: <0.2 AL
ENA SM AB SER IA-ACNC: <0.2 AL
ENA SS-A AB SER IA-ACNC: <0.2 AL
ENA SS-B AB SER IA-ACNC: <0.2 AL
EOSINOPHIL # BLD AUTO: 0.18 K/UL
EOSINOPHIL NFR BLD AUTO: 3.9 %
ERYTHROCYTE [SEDIMENTATION RATE] IN BLOOD BY WESTERGREN METHOD: 15 MM/HR
HAV IGM SER QL: NONREACTIVE
HBV CORE IGG+IGM SER QL: REACTIVE
HBV CORE IGM SER QL: NONREACTIVE
HBV SURFACE AG SER QL: NONREACTIVE
HCT VFR BLD CALC: 47.1 %
HCV AB SER QL: NONREACTIVE
HCV S/CO RATIO: 0.13 S/CO
HGB BLD-MCNC: 15.4 G/DL
IMM GRANULOCYTES NFR BLD AUTO: 0.2 %
LYMPHOCYTES # BLD AUTO: 1.57 K/UL
LYMPHOCYTES NFR BLD AUTO: 34.1 %
MAN DIFF?: NORMAL
MCHC RBC-ENTMCNC: 31 PG
MCHC RBC-ENTMCNC: 32.7 GM/DL
MCV RBC AUTO: 94.8 FL
MONOCYTES # BLD AUTO: 0.28 K/UL
MONOCYTES NFR BLD AUTO: 6.1 %
NEUTROPHILS # BLD AUTO: 2.49 K/UL
NEUTROPHILS NFR BLD AUTO: 54.2 %
PLATELET # BLD AUTO: 311 K/UL
POTASSIUM SERPL-SCNC: 4 MMOL/L
PROT SERPL-MCNC: 7.5 G/DL
PROT UR-MCNC: 8 MG/DL
RBC # BLD: 4.97 M/UL
RBC # FLD: 13.2 %
RHEUMATOID FACT SER QL: 13 IU/ML
RNA POLYMERASE III IGG: 5 UNITS
SODIUM SERPL-SCNC: 140 MMOL/L
THYROGLOB AB SERPL-ACNC: <20 IU/ML
THYROPEROXIDASE AB SERPL IA-ACNC: <10 IU/ML
WBC # FLD AUTO: 4.6 K/UL

## 2024-04-30 NOTE — ASSESSMENT
[FreeTextEntry1] : Ms. MOHAMUD is a non-smoking female with a PMHx hypothyroidism, glaucoma, TB who presents today for an initial consultation.  # rash -- check inflammatory markers  -- check serologies and sub-serologies -- contact dermatologist for her records to be transferred to me -- contact ophthalmologist for the patient's records  # h/o ? SLE  -- no current s/s of lupus -- unclear if this rash is related to an underlying active aiCTD at this time  -- check as above  CARE TEAM DR. MARCO ANTONIO PERKINS M.D., F.A.A.D. (Dermatologist) - [facility] Experienced D[facility] in [place], [place] - Address: [address] [place] [address] | [place] [address] - Contact: (Email) [email or web address] (Phone Number) = [phone]  DR. MONA CENTENO M.D. (Ophthalmologist) - [facility] ([email or web address]) - Address: [address] [place] [address] - Contact: (Phone Number) = [phone]   Today's medical care services serve as the continuing focal point for needed health care services that are part of ongoing care related to a patient's single, serious condition or a complex condition.  More than 50% of the encounter was spent counseling the patient on differential, workup, disease course and treatment/management.  Education was provided to the patient during this encounter.  All questions and concerns were addressed and answered.   The patient verbalized understanding and agreed to the plan.   Patient has been instructed to call for an appointment if new symptoms develop. Patient has been instructed to make a follow-up appointment in 3 months.   Time spent on the encounter included, but is not limited to, preparing to see the patient, obtaining and/or reviewing separately obtained history, performing the evaluation, counseling and educating, independently interpreting results with communication to patient, order placement, referring and/or communicating with other health professionals as described, and documenting clinical information in the electronic health record.

## 2024-04-30 NOTE — PHYSICAL EXAM
Physician link called for [facility] admission.  Will call us back with hospitalist.     Ly Tam, RN  08/10/17 [id number] [TextEntry] : CONSTITUTIONAL:  Alert, NAD, well nourished, well developed with normal voice EYES:  Normal white sclera, EOMI, lacrimal glands unremarkable ENT: Normal outer ear/nose, MMM, no oral ulcers NECK: normal appearance, thyroid not enlarged PULMONARY: no respiratory distress, on RA, CTA bilaterally CARDIO:  RRR, S1, S2, no rubs VASCULAR:  no RP, LR, C, C, E LYMPHATICS: no ant/post/supraclavicular nodes BACK: no CVA tenderness, no spinal tenderness, SI NT, FMTP neg MSK: no synovitis, no dactylitis SKIN: no rashes, nodules or tophi.  turgor normal.   no nail pitting. NEURO:  MS 5/5 proximally and distally.   No focal defects.  PSYCHE: alert and oriented x3, normal insight/judgement, normal affect. mood okay.  Recent memory intact

## 2024-04-30 NOTE — END OF VISIT
From: Sumanth Soto  To: Dyllan Gonzalez  Sent: 10/29/2021 1:02 PM CDT  Subject: Need a referral     My right heal has been really hurting for a few weeks now can give me a referral to a podiatrist please?   [Time Spent: ___ minutes] : I have spent [unfilled] minutes of time on the encounter.

## 2024-04-30 NOTE — HISTORY OF PRESENT ILLNESS
[FreeTextEntry1] : Ms. MOHAMUD is a non-smoking female with a PMHx hypothyroidism, glaucoma, TB who presents today for an initial consultation.  * This is the mother of my patient: Iva Cali  PRESENTING HISTORY  here for diffuse rash  - had a rash on her legs for more than 4-5 years - seen a dermatologist and have had creams but nothing has helped - the rash comes and goes, was originally just on the legs, but now it's on her whole body - never been diagnosed with psoriasis - feels itchy and burns - when she scratches, she damages the skin sometimes because she does not feel when the skin is in pain from the scratching - has had allergy testing done. was told she had to use a special detergent. - tried to change her diet but it did not make a difference - no history of injections - no history of biopsies - only ever used topical creams - was diagnosed with arthritis  in terms of the joints - swelling in the hands (worse at night) - pain in the hips - puts a cream on her hands when she is in pain - wakes up in the night from the pain sometimes  Rheum ROS  - denies RP, sicca, oral ulcers, rashes, photosensitivity.    - denies skin tightening, ulcerations, nodules - denies constitutional symptoms, fatigue, night sweats.  weight is stable  - Denies psoriasis, IBD, Inflammatory eye disease, STD, infectious diarrhea - breathes well without h/o of pleuritis, pericarditis.  renal function is normal and urine is not frothy - muscles are strong and there is no neurologic issues - no headaches, jaw pain with chewing, visual loss, scalp tenderness or double vision  - note that hands and feet turn cold and dark blue when cold - but do not have the pallor associated iwth RP - no preeclampsia - no high blood platelets - no nail pitting - sees a doctor for her glaucoma - states she has blood sometimes in her eyes - the patient was previously treated by Dr. Yusuf To - left medial upper condyle tender to palpation with redness and swelling - erythema that cross the nasal labial fold - patient was told she had Lupus once - never had further treatmnet - gets cold sores - no dry mouth - no dry eyes (can cry tears but was easier to cry as a child)  PAST MEDICAL HISTORY - hypothyroidism (on Levothyroxine) - glaucoma (uses drops) - arthritis (diagnosed but never seen a doctor for it) - no history of blood clots - spider veins - dactylitis of the feet - some swelling in the hands/fingers - had active tuberculosis in the past when she was 22 years old (8 months of treatment). No problems since then. Has a doctor who follows it.  SURGICAL HISTORY - carpal tunnel  FAMILY HISTORY - Mother (passed in a car accident when the patient was 6 years old) - Sister - diabetes, glaucoma  SOCIAL HISTORY - non-smoker - 2 children (4 pregnancies: 1 elective termination of pregnancy, 1 miscarriage during the 1st trimester at around 3 months, 2 normal pregnancies) - Bel[place] - No illicit drug use  CURRENT MEDS - Levothyroxine - Tylenol-Codeine  RHEUMATOLOGY REVIEW OF SYSTEMS - Denies RP, sicca, oral ulcers, photosensitivity.   - Denies skin tightening, ulcerations, nodules - Denies constitutional symptoms, fatigue, night sweats.  weight is stable. - Denies psoriasis, IBD, Inflammatory eye disease, STD, infectious diarrhea - Breathes well without h/o of pleuritis, pericarditis.  renal function is normal, and urine is not frothy. - Muscles are strong and there are no neurologic issues - No headaches, jaw pain with chewing, visual loss, scalp tenderness or double vision  CARE TEAM DR. MARCO ANTONIO PERKINS M.D., F.A.A.D. (Dermatologist) - [facility] Experienced Dermatologists in [place], [place] - Address: [address] [facility] | [place] [address] - Contact: (Email) [email or web address] (Phone Number) = [phone]  DR. MONA CENTENO M.D. (Ophthalmologist) - [facility] ([email or web address]) - Address: [address] [place] [address] - Contact: (Phone Number) = [phone] [Fever] : no fever [Chills] : no chills [Depression] : no depression [Oral Ulcers] : no oral ulcers [Cough] : no cough [Dry Mouth] : no dry mouth [Dry Eyes] : no dry eyes

## 2024-04-30 NOTE — ADDENDUM
[FreeTextEntry1] : This note was written by Monika Humphries, acting as the  for Dr. Win. This note accurately reflects the work and decisions made by Dr. Win.

## 2024-05-01 LAB
ALBUMIN MFR SERPL ELPH: 58.7 %
ALBUMIN SERPL-MCNC: 4.4 G/DL
ALBUMIN/GLOB SERPL: 1.4 RATIO
ALPHA1 GLOB MFR SERPL ELPH: 3 %
ALPHA1 GLOB SERPL ELPH-MCNC: 0.2 G/DL
ALPHA2 GLOB MFR SERPL ELPH: 9.1 %
ALPHA2 GLOB SERPL ELPH-MCNC: 0.7 G/DL
ANTI-BETA2 GLYCOPROTEIN 1 IGG CONCENTRATION: 3 U/ML
ANTI-BETA2 GLYCOPROTEIN 1 IGM CONCENTRATION: <2.4 U/ML
ANTI-CARDIOLIPIN IGG CONCENTRATION: 2 GPL
ANTI-CARDIOLIPIN IGM CONCENTRATION: 4 MPL
ANTI-CENP IGG CONCENTRATION: 0 U/ML
ANTI-CYCLIC CITRULLINATED PEPTIDE IGG CONCENTRATION: 1 U/ML
ANTI-DOUBLE-STRANDED DNA IGG CONCENTRATION: 53 IU/ML
ANTI-JO-1 IGG CONCENTRATION: <0.3 U/ML
ANTI-NUCLEAR ANTIBODIES - CYTOPLASMIC PATTERN: NORMAL
ANTI-NUCLEAR ANTIBODIES - PRIMARY NUCLEAR PATTERN: NORMAL
ANTI-NUCLEAR ANTIBODIES - PRIMARY PATTERN TITER: NEGATIVE
ANTI-NUCLEAR ANTIBODIES IGG CONCENTRATION: 15 UNITS
ANTI-RNA POL III IGG CONCENTRATION: <0.7 U/ML
ANTI-RNP70 IGG CONCENTRATION: 2 U/ML
ANTI-RO52 IGG CONCENTRATION: 0 U/ML
ANTI-RO60 IGG CONCENTRATION: <0.4 U/ML
ANTI-SCL-70 IGG CONCENTRATION: 1 U/ML
ANTI-SMITH IGG CONCENTRATION: <0.7 U/ML
ANTI-SS-B (LA) IGG CONCENTRATION: 1 U/ML
ANTI-THYROGLOBULIN IGG CONCENTRATION: <12 IU/ML
ANTI-THYROID PEROXIDASE IGG CONCENTRATION: <4 IU/ML
ANTI-U1RNP IGG CONCENTRATION: 1 U/ML
AVISE LUPUS INDEX: -2
AVISE LUPUS RESULT: NEGATIVE
B-GLOBULIN MFR SERPL ELPH: 12 %
B-GLOBULIN SERPL ELPH-MCNC: 0.9 G/DL
B-LYMPHOCYTE-BOUND C4D (BC4D) LEVEL: 17
DEPRECATED KAPPA LC FREE/LAMBDA SER: 0.83 RATIO
ERYTHROCYTE-BOUND C4D (EC4D) LEVEL: 2
G6PD SER-CCNC: 12.3 U/G HGB
GAMMA GLOB FLD ELPH-MCNC: 1.3 G/DL
GAMMA GLOB MFR SERPL ELPH: 17.2 %
HEPB DNA PCR INT: NOT DETECTED
HEPB DNA PCR LOG: NOT DETECTED LOGIU/ML
HISTONE AB SER QL: 0.8 UNITS
IGA SER QL IEP: 301 MG/DL
IGG SER QL IEP: 1200 MG/DL
IGM SER QL IEP: 137 MG/DL
INTERPRETATION SERPL IEP-IMP: NORMAL
KAPPA LC CSF-MCNC: 2.24 MG/DL
KAPPA LC SERPL-MCNC: 1.85 MG/DL
M PROTEIN SPEC IFE-MCNC: NORMAL
M TB IFN-G BLD-IMP: POSITIVE
PROT SERPL-MCNC: 7.5 G/DL
PROT SERPL-MCNC: 7.5 G/DL
QUANTIFERON TB PLUS MITOGEN MINUS NIL: >10 IU/ML
QUANTIFERON TB PLUS NIL: 0.05 IU/ML
QUANTIFERON TB PLUS TB1 MINUS NIL: 0.83 IU/ML
QUANTIFERON TB PLUS TB2 MINUS NIL: 1.01 IU/ML
RHEUMATOID FACTOR (IGA) CONCENTRATION: 7 IU/ML
RHEUMATOID FACTOR (IGM) CONCENTRATION: 1 IU/ML

## 2024-05-14 LAB
EJ AB SER QL: NEGATIVE
ENA JO1 AB SER IA-ACNC: <20 UNITS
ENA PM/SCL AB SER-ACNC: <20 UNITS
ENA SM+RNP AB SER IA-ACNC: <20 UNITS
ENA SS-A IGG SER QL: <20 UNITS
FIBRILLARIN AB SER QL: NEGATIVE
KU AB SER QL: NEGATIVE
MDA-5 (P140)(CADM-140): <20 UNITS
MI2 AB SER QL: NEGATIVE
NXP-2 (P140): <20 UNITS
OJ AB SER QL: NEGATIVE
PL12 AB SER QL: NEGATIVE
PL7 AB SER QL: NEGATIVE
SRP AB SERPL QL: NEGATIVE
TIF GAMMA (P155/140): <20 UNITS
U2 SNRNP AB SER QL: NEGATIVE

## 2024-06-19 ENCOUNTER — APPOINTMENT (OUTPATIENT)
Dept: GASTROENTEROLOGY | Facility: CLINIC | Age: 64
End: 2024-06-19

## 2024-07-08 ENCOUNTER — EMERGENCY (EMERGENCY)
Facility: HOSPITAL | Age: 64
LOS: 1 days | Discharge: HOME | End: 2024-07-08
Attending: STUDENT IN AN ORGANIZED HEALTH CARE EDUCATION/TRAINING PROGRAM
Payer: COMMERCIAL

## 2024-07-08 VITALS
TEMPERATURE: 98 F | WEIGHT: 160.06 LBS | RESPIRATION RATE: 19 BRPM | DIASTOLIC BLOOD PRESSURE: 76 MMHG | SYSTOLIC BLOOD PRESSURE: 131 MMHG | HEART RATE: 77 BPM | OXYGEN SATURATION: 94 % | HEIGHT: 63 IN

## 2024-07-08 DIAGNOSIS — Z98.890 OTHER SPECIFIED POSTPROCEDURAL STATES: Chronic | ICD-10-CM

## 2024-07-08 PROCEDURE — 99284 EMERGENCY DEPT VISIT MOD MDM: CPT | Mod: 25

## 2024-07-09 VITALS
RESPIRATION RATE: 14 BRPM | HEART RATE: 55 BPM | DIASTOLIC BLOOD PRESSURE: 59 MMHG | SYSTOLIC BLOOD PRESSURE: 112 MMHG | OXYGEN SATURATION: 98 % | TEMPERATURE: 98 F

## 2024-07-09 PROCEDURE — 71046 X-RAY EXAM CHEST 2 VIEWS: CPT | Mod: 26

## 2024-07-09 PROCEDURE — 93005 ELECTROCARDIOGRAM TRACING: CPT

## 2024-07-09 PROCEDURE — 99283 EMERGENCY DEPT VISIT LOW MDM: CPT | Mod: 25

## 2024-07-09 PROCEDURE — 71046 X-RAY EXAM CHEST 2 VIEWS: CPT

## 2024-07-09 RX ORDER — ACETAMINOPHEN 325 MG
1000 TABLET ORAL ONCE
Refills: 0 | Status: COMPLETED | OUTPATIENT
Start: 2024-07-09 | End: 2024-07-09

## 2024-07-09 RX ADMIN — Medication 1000 MILLIGRAM(S): at 03:38

## 2024-07-09 RX ADMIN — Medication 600 MILLIGRAM(S): at 03:38

## 2024-07-24 ENCOUNTER — APPOINTMENT (OUTPATIENT)
Dept: RHEUMATOLOGY | Facility: CLINIC | Age: 64
End: 2024-07-24
Payer: MEDICARE

## 2024-07-24 VITALS
OXYGEN SATURATION: 96 % | HEART RATE: 62 BPM | WEIGHT: 151 LBS | DIASTOLIC BLOOD PRESSURE: 75 MMHG | BODY MASS INDEX: 28.51 KG/M2 | SYSTOLIC BLOOD PRESSURE: 125 MMHG | HEIGHT: 61 IN

## 2024-07-24 DIAGNOSIS — M25.50 PAIN IN UNSPECIFIED JOINT: ICD-10-CM

## 2024-07-24 DIAGNOSIS — R21 RASH AND OTHER NONSPECIFIC SKIN ERUPTION: ICD-10-CM

## 2024-07-24 PROCEDURE — 99214 OFFICE O/P EST MOD 30 MIN: CPT

## 2024-07-24 PROCEDURE — G2211 COMPLEX E/M VISIT ADD ON: CPT

## 2024-07-29 NOTE — ASSESSMENT
[FreeTextEntry1] : Ms. MOHAMUD is a non-smoking female with a PMHx hypothyroidism, glaucoma, TB who presents today for an initial consultation.   # rash hasn't had rash to see - though it happens about every month or so -- rec derm f/u - possible biopsy   # h/o ? SLE  KIM at [facility] with dsf70 pattern and avise negative .  Although high titre, the pattern of DFS70 was previously correlated with the negative progression to KIM associated rheumatic disease.  More recent data, however, suggests that this alone may not be sufficient to exclude aiCTD, and sub- serologic testing is still required -- sub-serologies negative  -- no current s/s of active inflammatory arthritis or myositis thought patient says that when it occurs - gets a leg, knee, to mid shin - hot like on fire, red, swollen adn cant walk - feels hot to touch.   -- advised patient that when flare occurs - woudl be good to have RP so that it can be visualized better  # Quant TB  -- h/o treated TB  -- quant TB remains posisti ve -- f/u pcp for regular montioring   CARE TEAM DR. MARCO ANTONIO PERKINS M.D., F.A.A.D. (Dermatologist) - [facility] Experienced Dermatologists in [place], [place] - Address: [address] [place] [address] | [place] [address] - Contact: (Email) [email or web address] (Phone Number) = [phone]  DR. MONA CENTENO M.D. (Ophthalmologist) - [facility] ([email or web address]) - Address: [address] [place] [address] - Contact: (Phone Number) = [phone]   Today's medical care services serve as the continuing focal point for needed health care services that are part of ongoing care related to a patient's single, serious condition or a complex condition.  More than 50% of the encounter was spent counseling the patient on differential, workup, disease course and treatment/management.  Education was provided to the patient during this encounter.  All questions and concerns were addressed and answered.   The patient verbalized understanding and agreed to the plan.   Patient has been instructed to call for an appointment if new symptoms develop. Patient has been instructed to make a follow-up appointment in 3 months.   Time spent on the encounter included, but is not limited to, preparing to see the patient, obtaining and/or reviewing separately obtained history, performing the evaluation, counseling and educating, independently interpreting results with communication to patient, order placement, referring and/or communicating with other health professionals as described, and documenting clinical information in the electronic health record.um

## 2024-07-29 NOTE — HISTORY OF PRESENT ILLNESS
[FreeTextEntry1] : Ms. MOHAMUD is a non-smoking female with a PMHx hypothyroidism, glaucoma, TB who presents today for an initial consultation.  * This is the mother of my patient: Iva Cali   INTERVAL Hx remains the same  here to review b/w  was concerned about lupus or psoriasis  hasnt seen derm  PRESENTING HISTORY  here for diffuse rash  - had a rash on her legs for more than 4-5 years - seen a dermatologist and have had creams but nothing has helped - the rash comes and goes, was originally just on the legs, but now it's on her whole body - never been diagnosed with psoriasis - feels itchy and burns - when she scratches, she damages the skin sometimes because she does not feel when the skin is in pain from the scratching - has had allergy testing done. was told she had to use a special detergent. - tried to change her diet but it did not make a difference - no history of injections - no history of biopsies - only ever used topical creams - was diagnosed with arthritis  in terms of the joints - swelling in the hands (worse at night) - pain in the hips - puts a cream on her hands when she is in pain - wakes up in the night from the pain sometimes  PAST MEDICAL HISTORY - hypothyroidism (on Levothyroxine) - glaucoma (uses drops) - arthritis (diagnosed but never seen a doctor for it) - no history of blood clots - spider veins - dactylitis of the feet - some swelling in the hands/fingers - had active tuberculosis in the past when she was 22 years old (8 months of treatment). No problems since then. Has a doctor who follows it.  SURGICAL HISTORY - carpal tunnel  FAMILY HISTORY - Mother (passed in a car accident when the patient was 6 years old) - Sister - diabetes, glaucoma  SOCIAL HISTORY - non-smoker - 2 children (4 pregnancies: 1 elective termination of pregnancy, 1 miscarriage during the 1st trimester at around 3 months, 2 normal pregnancies) - Ecuadorean - No illicit drug use  CURRENT MEDS - Levothyroxine - Tylenol-Codeine  RHEUMATOLOGY REVIEW OF SYSTEMS - Denies RP, sicca, oral ulcers, photosensitivity.   - Denies skin tightening, ulcerations, nodules - Denies constitutional symptoms, fatigue, night sweats.  weight is stable. - Denies psoriasis, IBD, Inflammatory eye disease, STD, infectious diarrhea - Breathes well without h/o of pleuritis, pericarditis.  renal function is normal, and urine is not frothy. - Muscles are strong and there are no neurologic issues - No headaches, jaw pain with chewing, visual loss, scalp tenderness or double vision  CARE TEAM DR. MARCO ANTONIO PERKINS M.D., F.A.A.D. (Dermatologist) - [facility] Experienced Dermatologists in [place], [place] - Address: [facility] | [place] [address] - Contact: (Email) [email or web address] (Phone Number) = [phone]  DR. MONA CENTENO M.D. (Ophthalmologist) - [facility] ([email or web address]) - Address: [address] [place] [address] - Contact: (Phone Number) = [phone]

## 2024-07-29 NOTE — ASSESSMENT
[FreeTextEntry1] : Ms. MOHAMUD is a non-smoking female with a PMHx hypothyroidism, glaucoma, TB who presents today for an initial consultation.   # rash hasn't had rash to see - though it happens about every month or so -- rec derm f/u - possible biopsy   # h/o ? SLE  KIM at [facility] with dsf70 pattern and avise negative .  Although high titre, the pattern of DFS70 was previously correlated with the negative progression to KIM associated rheumatic disease.  More recent data, however, suggests that this alone may not be sufficient to exclude aiCTD, and sub- serologic testing is still required -- sub-serologies negative  -- no current s/s of active inflammatory arthritis or myositis thought patient says that when it occurs - gets a leg, knee, to mid shin - hot like on fire, red, swollen adn cant walk - feels hot to touch.   -- advised patient that when flare occurs - woudl be good to have RP so that it can be visualized better  # Quant TB  -- h/o treated TB  -- quant TB remains posisti ve -- f/u pcp for regular montioring   CARE TEAM DR. MARCO ANTONIO PERKINS M.D., F.A.A.D. (Dermatologist) - [facility] Experienced Dermatologists in [place], [place] - Address: [address] [place] [address] | [place] [address] - Contact: (Email) [email or web address] (Phone Number) = [phone]  DR. MONA CENTENO M.D. (Ophthalmologist) - [facility] ([email or web address]) - Address: [address] [place] [address] - Contact: (Phone Number) = [phone]   Today's medical care services serve as the continuing focal point for needed health care services that are part of ongoing care related to a patient's single, serious condition or a complex condition.  More than 50% of the encounter was spent counseling the patient on differential, workup, disease course and treatment/management.  Education was provided to the patient during this encounter.  All questions and concerns were addressed and answered.   The patient verbalized understanding and agreed to the plan.   Patient has been instructed to call for an appointment if new symptoms develop. Patient has been instructed to make a follow-up appointment in 3 months.   Time spent on the encounter included, but is not limited to, preparing to see the patient, obtaining and/or reviewing separately obtained history, performing the evaluation, counseling and educating, independently interpreting results with communication to patient, order placement, referring and/or communicating with other health professionals as described, and documenting clinical information in the electronic health record.um

## 2024-07-29 NOTE — HISTORY OF PRESENT ILLNESS
[FreeTextEntry1] : Ms. MOHAMUD is a non-smoking female with a PMHx hypothyroidism, glaucoma, TB who presents today for an initial consultation.  * This is the mother of my patient: Iva Cali   INTERVAL Hx remains the same  here to review b/w  was concerned about lupus or psoriasis  hasnt seen derm  PRESENTING HISTORY  here for diffuse rash  - had a rash on her legs for more than 4-5 years - seen a dermatologist and have had creams but nothing has helped - the rash comes and goes, was originally just on the legs, but now it's on her whole body - never been diagnosed with psoriasis - feels itchy and burns - when she scratches, she damages the skin sometimes because she does not feel when the skin is in pain from the scratching - has had allergy testing done. was told she had to use a special detergent. - tried to change her diet but it did not make a difference - no history of injections - no history of biopsies - only ever used topical creams - was diagnosed with arthritis  in terms of the joints - swelling in the hands (worse at night) - pain in the hips - puts a cream on her hands when she is in pain - wakes up in the night from the pain sometimes  PAST MEDICAL HISTORY - hypothyroidism (on Levothyroxine) - glaucoma (uses drops) - arthritis (diagnosed but never seen a doctor for it) - no history of blood clots - spider veins - dactylitis of the feet - some swelling in the hands/fingers - had active tuberculosis in the past when she was 22 years old (8 months of treatment). No problems since then. Has a doctor who follows it.  SURGICAL HISTORY - carpal tunnel  FAMILY HISTORY - Mother (passed in a car accident when the patient was 6 years old) - Sister - diabetes, glaucoma  SOCIAL HISTORY - non-smoker - 2 children (4 pregnancies: 1 elective termination of pregnancy, 1 miscarriage during the 1st trimester at around 3 months, 2 normal pregnancies) - Egyptian - No illicit drug use  CURRENT MEDS - Levothyroxine - Tylenol-Codeine  RHEUMATOLOGY REVIEW OF SYSTEMS - Denies RP, sicca, oral ulcers, photosensitivity.   - Denies skin tightening, ulcerations, nodules - Denies constitutional symptoms, fatigue, night sweats.  weight is stable. - Denies psoriasis, IBD, Inflammatory eye disease, STD, infectious diarrhea - Breathes well without h/o of pleuritis, pericarditis.  renal function is normal, and urine is not frothy. - Muscles are strong and there are no neurologic issues - No headaches, jaw pain with chewing, visual loss, scalp tenderness or double vision  CARE TEAM DR. MARCO ANTONIO PERKINS M.D., F.A.A.D. (Dermatologist) - [facility] Experienced Dermatologists in [place], [place] - Address: [facility] | [place] [address] - Contact: (Email) [email or web address] (Phone Number) = [phone]  DR. MONA CENTENO M.D. (Ophthalmologist) - [facility] ([email or web address]) - Address: [address] [place] [address] - Contact: (Phone Number) = [phone]

## 2024-07-29 NOTE — PHYSICAL EXAM
[General Appearance - Alert] : alert [General Appearance - In No Acute Distress] : in no acute distress [Abnormal Walk] : normal gait [Nail Clubbing] : no clubbing  or cyanosis of the fingernails [Musculoskeletal - Swelling] : no joint swelling seen [Motor Tone] : muscle strength and tone were normal [Skin Color & Pigmentation] : normal skin color and pigmentation [Skin Turgor] : normal skin turgor [] : no rash [Oriented To Time, Place, And Person] : oriented to person, place, and time [Impaired Insight] : insight and judgment were intact [Affect] : the affect was normal

## 2024-08-21 ENCOUNTER — APPOINTMENT (OUTPATIENT)
Dept: INTERNAL MEDICINE | Facility: CLINIC | Age: 64
End: 2024-08-21
Payer: MEDICARE

## 2024-08-21 VITALS
WEIGHT: 152 LBS | TEMPERATURE: 98 F | HEIGHT: 61 IN | DIASTOLIC BLOOD PRESSURE: 67 MMHG | HEART RATE: 67 BPM | BODY MASS INDEX: 28.7 KG/M2 | OXYGEN SATURATION: 98 % | SYSTOLIC BLOOD PRESSURE: 102 MMHG

## 2024-08-21 DIAGNOSIS — R52 PAIN, UNSPECIFIED: ICD-10-CM

## 2024-08-21 DIAGNOSIS — R76.8 OTHER SPECIFIED ABNORMAL IMMUNOLOGICAL FINDINGS IN SERUM: ICD-10-CM

## 2024-08-21 PROCEDURE — 99213 OFFICE O/P EST LOW 20 MIN: CPT

## 2024-08-21 PROCEDURE — G2211 COMPLEX E/M VISIT ADD ON: CPT

## 2024-08-21 NOTE — HISTORY OF PRESENT ILLNESS
[FreeTextEntry1] : f/u symptoms [de-identified] : reviewed rheum notes with pt pt states she had an undocumented fever last wk without other symps after she got shingrex only on thyroid meds similar other complaints

## 2024-08-22 LAB
ANION GAP SERPL CALC-SCNC: 11 MMOL/L
BASOPHILS # BLD AUTO: 0.12 K/UL
BASOPHILS NFR BLD AUTO: 1.8 %
BUN SERPL-MCNC: 16 MG/DL
CALCIUM SERPL-MCNC: 9 MG/DL
CHLORIDE SERPL-SCNC: 104 MMOL/L
CO2 SERPL-SCNC: 25 MMOL/L
CREAT SERPL-MCNC: 0.83 MG/DL
EGFR: 78 ML/MIN/1.73M2
EOSINOPHIL # BLD AUTO: 0.3 K/UL
EOSINOPHIL NFR BLD AUTO: 4.6 %
GLUCOSE SERPL-MCNC: 89 MG/DL
HCT VFR BLD CALC: 41.7 %
HGB BLD-MCNC: 13.6 G/DL
IMM GRANULOCYTES NFR BLD AUTO: 0.3 %
LYMPHOCYTES # BLD AUTO: 2.06 K/UL
LYMPHOCYTES NFR BLD AUTO: 31.5 %
MAN DIFF?: NORMAL
MCHC RBC-ENTMCNC: 31.3 PG
MCHC RBC-ENTMCNC: 32.6 GM/DL
MCV RBC AUTO: 95.9 FL
MONOCYTES # BLD AUTO: 0.42 K/UL
MONOCYTES NFR BLD AUTO: 6.4 %
NEUTROPHILS # BLD AUTO: 3.61 K/UL
NEUTROPHILS NFR BLD AUTO: 55.4 %
PLATELET # BLD AUTO: 345 K/UL
POTASSIUM SERPL-SCNC: 4.2 MMOL/L
RBC # BLD: 4.35 M/UL
RBC # FLD: 13.5 %
SODIUM SERPL-SCNC: 140 MMOL/L
WBC # FLD AUTO: 6.53 K/UL

## 2024-08-30 ENCOUNTER — EMERGENCY (EMERGENCY)
Facility: HOSPITAL | Age: 64
LOS: 1 days | Discharge: HOME | End: 2024-08-30
Attending: INTERNAL MEDICINE | Admitting: EMERGENCY MEDICINE
Payer: SELF-PAY

## 2024-08-30 VITALS
TEMPERATURE: 98 F | RESPIRATION RATE: 15 BRPM | OXYGEN SATURATION: 99 % | SYSTOLIC BLOOD PRESSURE: 114 MMHG | DIASTOLIC BLOOD PRESSURE: 71 MMHG | HEART RATE: 60 BPM | HEIGHT: 63 IN | WEIGHT: 149.91 LBS

## 2024-08-30 DIAGNOSIS — Z98.890 OTHER SPECIFIED POSTPROCEDURAL STATES: Chronic | ICD-10-CM

## 2024-08-30 PROCEDURE — 72125 CT NECK SPINE W/O DYE: CPT | Mod: 26,MC

## 2024-08-30 PROCEDURE — 70450 CT HEAD/BRAIN W/O DYE: CPT | Mod: 26,MC

## 2024-08-30 PROCEDURE — 99284 EMERGENCY DEPT VISIT MOD MDM: CPT

## 2024-08-30 PROCEDURE — 72125 CT NECK SPINE W/O DYE: CPT | Mod: MC

## 2024-08-30 PROCEDURE — 99284 EMERGENCY DEPT VISIT MOD MDM: CPT | Mod: 25

## 2024-08-30 PROCEDURE — 70450 CT HEAD/BRAIN W/O DYE: CPT | Mod: MC

## 2024-08-30 RX ORDER — ACETAMINOPHEN 325 MG/1
975 TABLET ORAL ONCE
Refills: 0 | Status: COMPLETED | OUTPATIENT
Start: 2024-08-30 | End: 2024-08-30

## 2024-08-30 RX ADMIN — ACETAMINOPHEN 975 MILLIGRAM(S): 325 TABLET ORAL at 19:06

## 2024-08-30 RX ADMIN — ACETAMINOPHEN 975 MILLIGRAM(S): 325 TABLET ORAL at 17:28

## 2024-08-30 NOTE — ED PROVIDER NOTE - NSFOLLOWUPINSTRUCTIONS_ED_ALL_ED_FT
There are many types of head injuries. They can be as minor as a small bump. Some head injuries can be worse. Worse injuries include:  A strong hit to the head that shakes the brain back and forth, causing damage (concussion).  A bruise (contusion) of the brain. This means there is bleeding in the brain that can cause swelling.  A cracked skull (skull fracture).  Bleeding in the brain that gathers, gets thick (makes a clot), and forms a bump (hematoma).  Most problems from a head injury come in the first 24 hours. However, you may still have side effects up to 7–10 days after your injury. It is important to watch your condition for any changes. You may need to be watched in the emergency department or urgent care, or you may need to stay in the hospital.    What are the causes?  There are many possible causes of a head injury. A serious head injury may be caused by:  A car accident.  Bicycle or motorcycle accidents.  Sports injuries.  Falls.  Being hit by an object.  What are the signs or symptoms?  Symptoms of a head injury include a bruise, bump, or bleeding where the injury happened. Other physical symptoms may include:  Headache.  Feeling like you may vomit (nauseous) or vomiting.  Dizziness.  Blurred or double vision.  Being uncomfortable around bright lights or loud noises.  Feeling tired.  Trouble waking up.  Severe symptoms such as:  Feeling weak or numb on one side of the body.  Slurred speech.  Swallowing problems.  Fainting.  Shaking movements that you cannot control (seizures).  Mental or emotional symptoms may include:  Feeling grumpy or cranky.  Confusion and memory problems.  Having trouble paying attention or concentrating.  Changes in eating or sleeping habits.  Feeling worried or nervous (anxious).  Feeling sad (depressed).  How is this treated?  Treatment for this condition depends on how bad the injury is and the type of injury you have. The main goal is to prevent problems and to allow the brain time to heal.    Mild head injury    If you have a mild head injury, you may be sent home, and treatment may include:  Being watched. A responsible adult should stay with you for 24 hours after your injury and check on you often.  Physical rest.  Brain rest.  Pain medicines.  Very bad head injury    If you have a very bad head injury, treatment may include:  Being watched closely. This includes staying in the hospital.  Medicines to:  Help with pain.  Prevent seizures.  Help with brain swelling.  Protecting your airway and using a machine that helps you breathe (ventilator).  Watching for and manage swelling inside the brain.  Brain surgery. This may be needed to:  Remove a collection of blood or blood clots.  Stop the bleeding.  Remove a part of the skull. This allows room for the brain to swell.  Follow these instructions at home:  Activity    Rest.  Avoid activities that are hard or tiring.  Make sure you get enough sleep.  Let your brain rest. Do fewer activities that need a lot of thought or attention, such as:  Watching TV.  Playing memory games and doing puzzles.  Job-related work or homework.  Working on the computer, social media, and texting.  Avoid activities that could cause another head injury until your doctor says it is okay. This includes playing sports.  Ask your doctor when it is safe for you to go back to your normal activities, such as work or school.  Ask your doctor when you can drive, ride a bicycle, or use machines. Do not do these activities if you are dizzy.  Lifestyle    A sign telling the reader not to drink beer, wine, or hard liquor.  Do not drink alcohol until your doctor says it is okay.  Do not use drugs.  If it is hard to remember things, write them down.  If you are easily distracted, try to do one thing at a time.  Talk with family members or close friends when making important decisions.  Tell your friends, family, a trusted co-worker, and  about your injury, symptoms, and limits (restrictions). Have them watch for any problems that are new or getting worse.  General instructions    Take over-the-counter and prescription medicines only as told by your doctor.  Have a responsible adult stay with you for 24 hours after your head injury. This person should watch you for any changes in your symptoms and be ready to get help.  Keep all follow-up visits to catch any new problems early.  How is this prevented?    Having another head injury can be dangerous. Another injury can lead to brain damage, brain swelling, or death. You can avoid this by:  Working on your balance and strength. This can help you avoid falls.  Wearing a seat belt when you are in a moving vehicle.  Wearing a helmet when you:  Ride a bicycle.  Ski.  Do any other sport or activity that has a risk of injury.  Making your home safer by:  Getting rid of clutter from the floors and stairs. This includes things that can make you trip.  Using grab bars in bathrooms and handrails by stairs.  Placing non-slip mats on floors and in bathtubs.  Putting more light in dim areas.  Where to find more information  Brain Injury Association: biausa.org  Contact a doctor if:  These symptoms do not go away:  Headaches.  Dizziness.  Double vision or vision changes.  Trouble sleeping.  Changes in mood.  You have new symptoms.  Get help right away if:  You have sudden:  Headache that is very bad.  Vomiting that does not stop.  Changes in the size of one of your pupils. Pupils are the black centers of your eyes.  Changes in how you see (vision).  More confusion or more grumpy moods.  You have a seizure.  Your symptoms get worse.  You have a clear or bloody fluid coming from your nose or ears.  These symptoms may be an emergency. Get help right away. Call 911.  Do not wait to see if the symptoms will go away.  Do not drive yourself to the hospital.  This information is not intended to replace advice given to you by your health care provider. Make sure you discuss any questions you have with your health care provider.

## 2024-08-30 NOTE — ED PROVIDER NOTE - PATIENT PORTAL LINK FT
You can access the FollowMyHealth Patient Portal offered by [facility] by registering at the following website: [email or web address]. By joining [facility]’s FollowMyHealth portal, you will also be able to view your health information using other applications (apps) compatible with our system.

## 2024-08-30 NOTE — ED PROVIDER NOTE - CARE PROVIDER_API CALL
Benny Kwong  Neurology  [address] [place] [address]  Phone: [phone]  Fax: [phone]  Follow Up Time: 1-3 Days    Juan Manuel Tejada  Orthopaedic Surgery  [address] [place] [address]  Phone: [phone]  Fax: [phone]  Established Patient  Follow Up Time:

## 2024-08-30 NOTE — ED PROVIDER NOTE - NSICDXPASTSURGICALHX_GEN_ALL_CORE_FT
PAST SURGICAL HISTORY:  H/O hand surgery     H/O right inguinal hernia repair     History of bunionectomy of both great toes     Personal history of spine surgery lumbar with hardware    S/P LASIK surgery left eye

## 2024-08-30 NOTE — ED PROVIDER NOTE - CLINICAL SUMMARY MEDICAL DECISION MAKING FREE TEXT BOX
63 F c/o posterior head pain after fall off stool. States she was sitting on a stool ~1 foot off ground when it broke causing her to fall back and hit her head on a shelf. No LOC. No blood thinners. Was given ibuprofen. Denies neck pain, back pain, cp, sob, n/v, numbness, weakness, acute vision change. VSS Exam stable CT head an neck are no0n acute, stable for discharge  and O/P referral

## 2024-08-30 NOTE — ED PROVIDER NOTE - OBJECTIVE STATEMENT
63 F c/o posterior head pain after fall off stool. States she was sitting on a stool ~1 foot off ground when it broke causing her to fall back and hit her head on a shelf. No LOC. No blood thinners. Was given ibuprofen. Denies neck pain, back pain, cp, sob, n/v, numbness, weakness, acute vision change.

## 2024-08-30 NOTE — ED PROVIDER NOTE - CARE PROVIDERS DIRECT ADDRESSES
,DirectAddress_Unknown,[email or web address].Formerly [facility], [email or web address]

## 2024-08-30 NOTE — ED PROVIDER NOTE - PROVIDER TOKENS
PROVIDER:[TOKEN:[[id number]],FOLLOWUP:[1-3 Days]],PROVIDER:[TOKEN:[[id number]],ESTABLISHEDPATIENT:[T]]

## 2024-08-30 NOTE — ED ADULT TRIAGE NOTE - WEIGHT IN LBS
General Sunscreen Counseling: I recommended a broad spectrum sunscreen with at least SPF of 30, but higher is better.  I explained that SPF 30 sunscreens block approximately 97 percent of the sun's harmful rays in vitro, but in practice a higher SPF offers more protection from sun exposure as most people don't use the density of application to get the number on the body.  Sunscreens should be applied at least 15 minutes prior to expected sun exposure and then every 2 hours after that as long as sun exposure continues. If swimming or exercising sunscreen should be reapplied more frequently.  One ounce, or 30 fluid ounces (the equivalent of a shot glass full of sunscreen), is adequate to protect the skin not covered by a bathing suit. I also recommended a lip balm with a sunscreen as well. Sun protective clothing (UPF50) can be used in lieu of sunscreen but must be worn the entire time you are exposed to the sun's rays.
Detail Level: Generalized
149.9

## 2024-09-05 ENCOUNTER — OFFICE (OUTPATIENT)
Facility: LOCATION | Age: 64
Setting detail: OPHTHALMOLOGY
End: 2024-09-05
Payer: COMMERCIAL

## 2024-09-05 DIAGNOSIS — H16.223: ICD-10-CM

## 2024-09-05 DIAGNOSIS — H40.033: ICD-10-CM

## 2024-09-05 PROCEDURE — 92020 GONIOSCOPY: CPT | Performed by: OPHTHALMOLOGY

## 2024-09-05 PROCEDURE — 99213 OFFICE O/P EST LOW 20 MIN: CPT | Performed by: OPHTHALMOLOGY

## 2024-09-05 PROCEDURE — 92133 CPTRZD OPH DX IMG PST SGM ON: CPT | Performed by: OPHTHALMOLOGY

## 2024-09-05 ASSESSMENT — CONFRONTATIONAL VISUAL FIELD TEST (CVF)
OS_FINDINGS: FULL
OD_FINDINGS: FULL

## 2024-09-05 ASSESSMENT — LID EXAM ASSESSMENTS
OS_MEIBOMITIS: 1+
OD_MEIBOMITIS: 1+

## 2024-10-18 ENCOUNTER — NON-APPOINTMENT (OUTPATIENT)
Age: 64
End: 2024-10-18

## 2024-10-18 ENCOUNTER — APPOINTMENT (OUTPATIENT)
Dept: INTERNAL MEDICINE | Facility: CLINIC | Age: 64
End: 2024-10-18
Payer: MEDICARE

## 2024-10-18 VITALS
OXYGEN SATURATION: 97 % | WEIGHT: 150 LBS | DIASTOLIC BLOOD PRESSURE: 75 MMHG | SYSTOLIC BLOOD PRESSURE: 117 MMHG | HEIGHT: 61.5 IN | RESPIRATION RATE: 16 BRPM | BODY MASS INDEX: 27.96 KG/M2 | TEMPERATURE: 97.7 F | HEART RATE: 64 BPM

## 2024-10-18 PROCEDURE — G0438: CPT

## 2024-10-18 PROCEDURE — 93000 ELECTROCARDIOGRAM COMPLETE: CPT

## 2024-10-20 LAB
ALBUMIN SERPL ELPH-MCNC: 4.2 G/DL
ALP BLD-CCNC: 89 U/L
ALT SERPL-CCNC: 35 U/L
ANION GAP SERPL CALC-SCNC: 15 MMOL/L
APPEARANCE: CLEAR
AST SERPL-CCNC: 37 U/L
BACTERIA: NEGATIVE /HPF
BASOPHILS # BLD AUTO: 0.09 K/UL
BASOPHILS NFR BLD AUTO: 1.7 %
BILIRUB SERPL-MCNC: 0.4 MG/DL
BILIRUBIN URINE: NEGATIVE
BLOOD URINE: NEGATIVE
BUN SERPL-MCNC: 17 MG/DL
CALCIUM SERPL-MCNC: 9.4 MG/DL
CAST: 0 /LPF
CHLORIDE SERPL-SCNC: 104 MMOL/L
CHOLEST SERPL-MCNC: 185 MG/DL
CO2 SERPL-SCNC: 25 MMOL/L
COLOR: YELLOW
CREAT SERPL-MCNC: 0.66 MG/DL
EGFR: 97 ML/MIN/1.73M2
EOSINOPHIL # BLD AUTO: 0.36 K/UL
EOSINOPHIL NFR BLD AUTO: 6.8 %
EPITHELIAL CELLS: 1 /HPF
GLUCOSE QUALITATIVE U: NEGATIVE MG/DL
GLUCOSE SERPL-MCNC: 106 MG/DL
HCT VFR BLD CALC: 43 %
HDLC SERPL-MCNC: 53 MG/DL
HGB BLD-MCNC: 14.2 G/DL
IMM GRANULOCYTES NFR BLD AUTO: 0.4 %
KETONES URINE: NEGATIVE MG/DL
LDLC SERPL CALC-MCNC: 119 MG/DL
LEUKOCYTE ESTERASE URINE: ABNORMAL
LYMPHOCYTES # BLD AUTO: 1.5 K/UL
LYMPHOCYTES NFR BLD AUTO: 28.1 %
MAN DIFF?: NORMAL
MCHC RBC-ENTMCNC: 31.2 PG
MCHC RBC-ENTMCNC: 33 GM/DL
MCV RBC AUTO: 94.5 FL
MICROSCOPIC-UA: NORMAL
MONOCYTES # BLD AUTO: 0.34 K/UL
MONOCYTES NFR BLD AUTO: 6.4 %
NEUTROPHILS # BLD AUTO: 3.02 K/UL
NEUTROPHILS NFR BLD AUTO: 56.6 %
NITRITE URINE: NEGATIVE
NONHDLC SERPL-MCNC: 132 MG/DL
PH URINE: 7.5
PLATELET # BLD AUTO: 282 K/UL
POTASSIUM SERPL-SCNC: 4.6 MMOL/L
PROT SERPL-MCNC: 6.8 G/DL
PROTEIN URINE: NEGATIVE MG/DL
RBC # BLD: 4.55 M/UL
RBC # FLD: 13.2 %
RED BLOOD CELLS URINE: 1 /HPF
SODIUM SERPL-SCNC: 144 MMOL/L
SPECIFIC GRAVITY URINE: 1.01
TRIGL SERPL-MCNC: 71 MG/DL
TSH SERPL-ACNC: 3.39 UIU/ML
UROBILINOGEN URINE: 0.2 MG/DL
WBC # FLD AUTO: 5.33 K/UL
WHITE BLOOD CELLS URINE: 0 /HPF

## 2024-10-22 ENCOUNTER — APPOINTMENT (OUTPATIENT)
Dept: OBGYN | Facility: CLINIC | Age: 64
End: 2024-10-22
Payer: MEDICARE

## 2024-10-22 VITALS
WEIGHT: 150 LBS | HEIGHT: 62 IN | BODY MASS INDEX: 27.6 KG/M2 | SYSTOLIC BLOOD PRESSURE: 120 MMHG | DIASTOLIC BLOOD PRESSURE: 77 MMHG

## 2024-10-22 DIAGNOSIS — Z87.42 PERSONAL HISTORY OF OTHER DISEASES OF THE FEMALE GENITAL TRACT: ICD-10-CM

## 2024-10-22 DIAGNOSIS — Z00.00 ENCOUNTER FOR GENERAL ADULT MEDICAL EXAMINATION W/OUT ABNORMAL FINDINGS: ICD-10-CM

## 2024-10-22 DIAGNOSIS — N89.8 OTHER SPECIFIED NONINFLAMMATORY DISORDERS OF VAGINA: ICD-10-CM

## 2024-10-22 DIAGNOSIS — K64.2 THIRD DEGREE HEMORRHOIDS: ICD-10-CM

## 2024-10-22 PROCEDURE — 87210 SMEAR WET MOUNT SALINE/INK: CPT | Mod: QW

## 2024-10-22 PROCEDURE — 82270 OCCULT BLOOD FECES: CPT

## 2024-10-22 PROCEDURE — G0101: CPT | Mod: GA

## 2024-10-23 LAB
CANDIDA VAG CYTO: NOT DETECTED
G VAGINALIS+PREV SP MTYP VAG QL MICRO: NOT DETECTED
T VAGINALIS VAG QL WET PREP: NOT DETECTED

## 2024-10-26 LAB — CYTOLOGY CVX/VAG DOC THIN PREP: ABNORMAL

## 2024-11-01 DIAGNOSIS — Z20.1 CONTACT WITH AND (SUSPECTED) EXPOSURE TO TUBERCULOSIS: ICD-10-CM

## 2024-11-08 ENCOUNTER — TRANSCRIPTION ENCOUNTER (OUTPATIENT)
Age: 64
End: 2024-11-08

## 2024-11-08 LAB
M TB IFN-G BLD-IMP: POSITIVE
QUANTIFERON TB PLUS MITOGEN MINUS NIL: 4.66 IU/ML
QUANTIFERON TB PLUS NIL: 0.04 IU/ML
QUANTIFERON TB PLUS TB1 MINUS NIL: 0.56 IU/ML
QUANTIFERON TB PLUS TB2 MINUS NIL: 0.62 IU/ML

## 2024-11-11 ENCOUNTER — RX RENEWAL (OUTPATIENT)
Age: 64
End: 2024-11-11

## 2024-11-12 ENCOUNTER — APPOINTMENT (OUTPATIENT)
Dept: OBGYN | Facility: CLINIC | Age: 64
End: 2024-11-12
Payer: MEDICARE

## 2024-11-12 VITALS
HEIGHT: 62 IN | DIASTOLIC BLOOD PRESSURE: 79 MMHG | SYSTOLIC BLOOD PRESSURE: 125 MMHG | WEIGHT: 160 LBS | BODY MASS INDEX: 29.44 KG/M2

## 2024-11-12 DIAGNOSIS — N89.8 OTHER SPECIFIED NONINFLAMMATORY DISORDERS OF VAGINA: ICD-10-CM

## 2024-11-12 PROCEDURE — 99214 OFFICE O/P EST MOD 30 MIN: CPT

## 2024-11-12 PROCEDURE — 99459 PELVIC EXAMINATION: CPT

## 2024-11-15 ENCOUNTER — APPOINTMENT (OUTPATIENT)
Dept: RADIOLOGY | Facility: CLINIC | Age: 64
End: 2024-11-15
Payer: MEDICARE

## 2024-11-15 PROCEDURE — 71046 X-RAY EXAM CHEST 2 VIEWS: CPT

## 2024-11-18 ENCOUNTER — APPOINTMENT (OUTPATIENT)
Dept: OBGYN | Facility: CLINIC | Age: 64
End: 2024-11-18
Payer: MEDICARE

## 2024-11-18 ENCOUNTER — ASOB RESULT (OUTPATIENT)
Age: 64
End: 2024-11-18

## 2024-11-18 DIAGNOSIS — Z00.00 ENCOUNTER FOR GENERAL ADULT MEDICAL EXAMINATION W/OUT ABNORMAL FINDINGS: ICD-10-CM

## 2024-11-18 DIAGNOSIS — K64.2 THIRD DEGREE HEMORRHOIDS: ICD-10-CM

## 2024-11-18 DIAGNOSIS — R91.1 SOLITARY PULMONARY NODULE: ICD-10-CM

## 2024-11-18 PROCEDURE — 76830 TRANSVAGINAL US NON-OB: CPT

## 2024-11-19 LAB
MYCOPLASMA HOMINIS CULTURE: NEGATIVE
UREAPLASMA CULTURE: NEGATIVE

## 2024-11-25 ENCOUNTER — OUTPATIENT (OUTPATIENT)
Dept: OUTPATIENT SERVICES | Facility: HOSPITAL | Age: 64
LOS: 1 days | End: 2024-11-25
Payer: COMMERCIAL

## 2024-11-25 ENCOUNTER — APPOINTMENT (OUTPATIENT)
Dept: CT IMAGING | Facility: CLINIC | Age: 64
End: 2024-11-25
Payer: MEDICARE

## 2024-11-25 DIAGNOSIS — R91.1 SOLITARY PULMONARY NODULE: ICD-10-CM

## 2024-11-25 DIAGNOSIS — Z98.890 OTHER SPECIFIED POSTPROCEDURAL STATES: Chronic | ICD-10-CM

## 2024-11-25 PROCEDURE — 71250 CT THORAX DX C-: CPT | Mod: 26

## 2024-11-25 PROCEDURE — 71250 CT THORAX DX C-: CPT

## 2024-12-03 ENCOUNTER — TRANSCRIPTION ENCOUNTER (OUTPATIENT)
Age: 64
End: 2024-12-03

## 2024-12-11 ENCOUNTER — RX RENEWAL (OUTPATIENT)
Age: 64
End: 2024-12-11

## 2025-02-03 ENCOUNTER — APPOINTMENT (OUTPATIENT)
Dept: INTERNAL MEDICINE | Facility: CLINIC | Age: 65
End: 2025-02-03
Payer: MEDICARE

## 2025-02-03 VITALS
WEIGHT: 151 LBS | TEMPERATURE: 97.7 F | SYSTOLIC BLOOD PRESSURE: 125 MMHG | OXYGEN SATURATION: 99 % | RESPIRATION RATE: 16 BRPM | HEIGHT: 61 IN | DIASTOLIC BLOOD PRESSURE: 76 MMHG | BODY MASS INDEX: 28.51 KG/M2 | HEART RATE: 61 BPM

## 2025-02-03 DIAGNOSIS — R05.9 COUGH, UNSPECIFIED: ICD-10-CM

## 2025-02-03 DIAGNOSIS — R76.12 NONSPECIFIC REACTION TO CELL MEDIATED IMMUNITY MEASUREMENT OF GAMMA INTERFERON ANTIGEN RESPONSE W/OUT ACTIVE TUBERCULOSIS: ICD-10-CM

## 2025-02-03 DIAGNOSIS — E03.9 HYPOTHYROIDISM, UNSPECIFIED: ICD-10-CM

## 2025-02-03 PROCEDURE — 99213 OFFICE O/P EST LOW 20 MIN: CPT

## 2025-02-03 PROCEDURE — G2211 COMPLEX E/M VISIT ADD ON: CPT

## 2025-02-12 ENCOUNTER — APPOINTMENT (OUTPATIENT)
Dept: ENDOCRINOLOGY | Facility: CLINIC | Age: 65
End: 2025-02-12

## 2025-02-15 ENCOUNTER — APPOINTMENT (OUTPATIENT)
Dept: RADIOLOGY | Facility: CLINIC | Age: 65
End: 2025-02-15
Payer: MEDICARE

## 2025-02-15 PROCEDURE — 71046 X-RAY EXAM CHEST 2 VIEWS: CPT

## 2025-03-03 NOTE — ED ADULT NURSE NOTE - NSFALLUNIVINTERV_ED_ALL_ED
By optimizing your health through good nutrition, daily exercise, stress management, low/moderate alcohol and avoidance of tobacco, sugar and processed foods, you can help to decrease your risk of cardiovascular disease and stroke and achieve a healthy weight.   A diet rich in whole, plant-based foods such as vegetables, beans, seeds, nuts, whole grains, healthy fats and fruit leads to lower body mass index, blood pressure, HbA1C, and cholesterol levels.   
Bed/Stretcher in lowest position, wheels locked, appropriate side rails in place/Call bell, personal items and telephone in reach/Instruct patient to call for assistance before getting out of bed/chair/stretcher/Non-slip footwear applied when patient is off stretcher/[place] to call system/Physically safe environment - no spills, clutter or unnecessary equipment/Purposeful proactive rounding/Room/bathroom lighting operational, light cord in reach

## 2025-03-14 ENCOUNTER — APPOINTMENT (OUTPATIENT)
Dept: ENDOCRINOLOGY | Facility: CLINIC | Age: 65
End: 2025-03-14
Payer: MEDICARE

## 2025-03-14 VITALS
HEIGHT: 62 IN | OXYGEN SATURATION: 99 % | HEART RATE: 71 BPM | BODY MASS INDEX: 27.79 KG/M2 | WEIGHT: 151 LBS | SYSTOLIC BLOOD PRESSURE: 118 MMHG | DIASTOLIC BLOOD PRESSURE: 75 MMHG | RESPIRATION RATE: 16 BRPM

## 2025-03-14 DIAGNOSIS — R59.1 GENERALIZED ENLARGED LYMPH NODES: ICD-10-CM

## 2025-03-14 DIAGNOSIS — R94.6 ABNORMAL RESULTS OF THYROID FUNCTION STUDIES: ICD-10-CM

## 2025-03-14 DIAGNOSIS — E03.9 HYPOTHYROIDISM, UNSPECIFIED: ICD-10-CM

## 2025-03-14 DIAGNOSIS — M85.80 OTHER SPECIFIED DISORDERS OF BONE DENSITY AND STRUCTURE, UNSPECIFIED SITE: ICD-10-CM

## 2025-03-14 PROCEDURE — 99204 OFFICE O/P NEW MOD 45 MIN: CPT | Mod: 25

## 2025-03-16 LAB
25(OH)D3 SERPL-MCNC: 25.6 NG/ML
ALBUMIN SERPL ELPH-MCNC: 4.3 G/DL
CALCIUM SERPL-MCNC: 9.4 MG/DL
ESTIMATED AVERAGE GLUCOSE: 123 MG/DL
HBA1C MFR BLD HPLC: 5.9 %
T3 SERPL-MCNC: 162 NG/DL
T4 FREE SERPL-MCNC: 1 NG/DL
TSH SERPL-ACNC: 2.9 UIU/ML

## 2025-03-19 ENCOUNTER — APPOINTMENT (OUTPATIENT)
Dept: RADIOLOGY | Facility: CLINIC | Age: 65
End: 2025-03-19
Payer: MEDICARE

## 2025-03-19 ENCOUNTER — APPOINTMENT (OUTPATIENT)
Dept: ULTRASOUND IMAGING | Facility: CLINIC | Age: 65
End: 2025-03-19

## 2025-03-19 PROCEDURE — 76536 US EXAM OF HEAD AND NECK: CPT

## 2025-03-19 PROCEDURE — 77080 DXA BONE DENSITY AXIAL: CPT

## 2025-03-28 ENCOUNTER — TRANSCRIPTION ENCOUNTER (OUTPATIENT)
Age: 65
End: 2025-03-28

## 2025-04-04 ENCOUNTER — NON-APPOINTMENT (OUTPATIENT)
Age: 65
End: 2025-04-04

## 2025-04-04 DIAGNOSIS — M81.0 AGE-RELATED OSTEOPOROSIS W/OUT CURRENT PATHOLOGICAL FRACTURE: ICD-10-CM

## 2025-04-10 RX ORDER — RISEDRONATE SODIUM 150 MG/1
150 TABLET, FILM COATED ORAL
Qty: 3 | Refills: 3 | Status: ACTIVE | COMMUNITY
Start: 2025-04-04

## 2025-05-14 ENCOUNTER — APPOINTMENT (OUTPATIENT)
Dept: ENDOCRINOLOGY | Facility: CLINIC | Age: 65
End: 2025-05-14
Payer: MEDICARE

## 2025-05-14 DIAGNOSIS — E03.9 HYPOTHYROIDISM, UNSPECIFIED: ICD-10-CM

## 2025-05-14 DIAGNOSIS — M81.0 AGE-RELATED OSTEOPOROSIS W/OUT CURRENT PATHOLOGICAL FRACTURE: ICD-10-CM

## 2025-05-14 PROCEDURE — 99213 OFFICE O/P EST LOW 20 MIN: CPT | Mod: 2W

## 2025-05-14 RX ORDER — ZOLEDRONIC ACID 5 MG/100ML
5 INJECTION INTRAVENOUS
Qty: 1 | Refills: 0 | Status: ACTIVE | OUTPATIENT
Start: 2025-05-14

## 2025-05-14 RX ADMIN — Medication 0 MG: at 00:00

## 2025-06-03 ENCOUNTER — TRANSCRIPTION ENCOUNTER (OUTPATIENT)
Age: 65
End: 2025-06-03

## 2025-06-25 ENCOUNTER — APPOINTMENT (OUTPATIENT)
Dept: RHEUMATOLOGY | Facility: CLINIC | Age: 65
End: 2025-06-25
Payer: MEDICARE

## 2025-06-25 VITALS
SYSTOLIC BLOOD PRESSURE: 111 MMHG | OXYGEN SATURATION: 97 % | TEMPERATURE: 97.9 F | HEART RATE: 57 BPM | DIASTOLIC BLOOD PRESSURE: 62 MMHG | RESPIRATION RATE: 16 BRPM

## 2025-06-25 VITALS
HEART RATE: 55 BPM | DIASTOLIC BLOOD PRESSURE: 66 MMHG | SYSTOLIC BLOOD PRESSURE: 115 MMHG | RESPIRATION RATE: 16 BRPM | OXYGEN SATURATION: 99 %

## 2025-06-25 PROCEDURE — 96374 THER/PROPH/DIAG INJ IV PUSH: CPT

## 2025-06-25 RX ORDER — ZOLEDRONIC ACID 5 MG/100ML
5 INJECTION INTRAVENOUS
Qty: 0 | Refills: 0 | Status: COMPLETED | OUTPATIENT
Start: 2025-06-16

## (undated) DEVICE — FORCEP RADIAL JAW 4 JUMBO 2.8MM 3.2MM 240CM ORANGE DISP

## (undated) DEVICE — ELCTR GROUNDING PAD ADULT COVIDIEN

## (undated) DEVICE — IRRIGATOR BIO SHIELD

## (undated) DEVICE — BRUSH COLONOSCOPY CYTOLOGY

## (undated) DEVICE — SENSOR O2 FINGER ADULT

## (undated) DEVICE — CATH IV SAFE BC 20G X 1.16" (PINK)

## (undated) DEVICE — SUCTION YANKAUER NO CONTROL VENT

## (undated) DEVICE — BIOPSY FORCEP RADIAL JAW 4 STANDARD WITH NEEDLE

## (undated) DEVICE — SNARE CAPTIVATOR COLD RND STIFF 10X2.4X2.8MM 240CM

## (undated) DEVICE — CLAMP BX HOT RAD JAW 3

## (undated) DEVICE — PACK IV START WITH CHG

## (undated) DEVICE — TUBING IV SET GRAVITY 3Y 100" MACRO

## (undated) DEVICE — TUBING SUCTION CONN 6FT STERILE

## (undated) DEVICE — SOL INJ NS 0.9% 500ML 2 PORT

## (undated) DEVICE — FOLEY HOLDER STATLOCK 2 WAY ADULT

## (undated) DEVICE — CATH IV SAFE BC 22G X 1" (BLUE)

## (undated) DEVICE — TUBING SUCTION 20FT

## (undated) DEVICE — SYR LUER LOK 50CC

## (undated) DEVICE — POLY TRAP ETRAP